# Patient Record
Sex: FEMALE | Race: WHITE | Employment: OTHER | URBAN - NONMETROPOLITAN AREA
[De-identification: names, ages, dates, MRNs, and addresses within clinical notes are randomized per-mention and may not be internally consistent; named-entity substitution may affect disease eponyms.]

---

## 2017-01-03 LAB — GLUCOSE BLD-MCNC: 128 MG/DL

## 2017-01-04 LAB
BUN BLDV-MCNC: NORMAL MG/DL
CALCIUM SERPL-MCNC: NORMAL MG/DL
CHLORIDE BLD-SCNC: NORMAL MMOL/L
CHOLESTEROL, TOTAL: 153 MG/DL
CHOLESTEROL/HDL RATIO: 3.1
CO2: NORMAL MMOL/L
CREAT SERPL-MCNC: NORMAL MG/DL
CREATININE, URINE: 218.9
GFR CALCULATED: NORMAL
GLUCOSE BLD-MCNC: 138 MG/DL
HDLC SERPL-MCNC: 50 MG/DL (ref 35–70)
LDL CHOLESTEROL CALCULATED: 81.2 MG/DL (ref 0–160)
MICROALBUMIN/CREAT 24H UR: 1.5 MG/G{CREAT}
MICROALBUMIN/CREAT UR-RTO: 6.9
POTASSIUM SERPL-SCNC: NORMAL MMOL/L
SODIUM BLD-SCNC: NORMAL MMOL/L
TRIGL SERPL-MCNC: 109 MG/DL
VLDLC SERPL CALC-MCNC: 22 MG/DL

## 2017-04-04 LAB — HBA1C MFR BLD: 5.9 %

## 2017-07-14 VITALS
DIASTOLIC BLOOD PRESSURE: 82 MMHG | HEIGHT: 64 IN | TEMPERATURE: 97.9 F | HEART RATE: 77 BPM | BODY MASS INDEX: 34.15 KG/M2 | WEIGHT: 200 LBS | OXYGEN SATURATION: 94 % | SYSTOLIC BLOOD PRESSURE: 106 MMHG

## 2017-07-14 DIAGNOSIS — R92.8 ABNORMAL MAMMOGRAM: ICD-10-CM

## 2017-10-26 ENCOUNTER — OFFICE VISIT (OUTPATIENT)
Dept: FAMILY MEDICINE CLINIC | Age: 62
End: 2017-10-26
Payer: COMMERCIAL

## 2017-10-26 VITALS
SYSTOLIC BLOOD PRESSURE: 124 MMHG | HEART RATE: 60 BPM | BODY MASS INDEX: 34.2 KG/M2 | WEIGHT: 193 LBS | DIASTOLIC BLOOD PRESSURE: 80 MMHG | HEIGHT: 63 IN

## 2017-10-26 DIAGNOSIS — Z12.31 SCREENING MAMMOGRAM, ENCOUNTER FOR: ICD-10-CM

## 2017-10-26 DIAGNOSIS — R73.01 IFG (IMPAIRED FASTING GLUCOSE): Primary | ICD-10-CM

## 2017-10-26 LAB — HBA1C MFR BLD: 5.8 %

## 2017-10-26 PROCEDURE — G8482 FLU IMMUNIZE ORDER/ADMIN: HCPCS | Performed by: FAMILY MEDICINE

## 2017-10-26 PROCEDURE — 3014F SCREEN MAMMO DOC REV: CPT | Performed by: FAMILY MEDICINE

## 2017-10-26 PROCEDURE — 1036F TOBACCO NON-USER: CPT | Performed by: FAMILY MEDICINE

## 2017-10-26 PROCEDURE — 83036 HEMOGLOBIN GLYCOSYLATED A1C: CPT | Performed by: FAMILY MEDICINE

## 2017-10-26 PROCEDURE — 3017F COLORECTAL CA SCREEN DOC REV: CPT | Performed by: FAMILY MEDICINE

## 2017-10-26 PROCEDURE — 99213 OFFICE O/P EST LOW 20 MIN: CPT | Performed by: FAMILY MEDICINE

## 2017-10-26 PROCEDURE — G8427 DOCREV CUR MEDS BY ELIG CLIN: HCPCS | Performed by: FAMILY MEDICINE

## 2017-10-26 PROCEDURE — G8417 CALC BMI ABV UP PARAM F/U: HCPCS | Performed by: FAMILY MEDICINE

## 2017-10-26 RX ORDER — DOXYCYCLINE HYCLATE 20 MG
TABLET ORAL
COMMUNITY
Start: 2017-10-04 | End: 2018-08-09

## 2017-10-26 ASSESSMENT — PATIENT HEALTH QUESTIONNAIRE - PHQ9
2. FEELING DOWN, DEPRESSED OR HOPELESS: 0
SUM OF ALL RESPONSES TO PHQ9 QUESTIONS 1 & 2: 0
SUM OF ALL RESPONSES TO PHQ QUESTIONS 1-9: 0
1. LITTLE INTEREST OR PLEASURE IN DOING THINGS: 0

## 2017-10-26 NOTE — PROGRESS NOTES
[address] [place] [address] [address]  Dept: [phone]  Dept Fax: [phone]    Janae Bay is a 57 y.o. female who presents today for her medical conditions/complaints as noted below.   Janae Bay is c/o of 6 Month Follow-Up (pt here to f/u on glucose and also has a spot on the inside of lower lip that would like ck'd)      HPI:     HPI   Patient is here for a routine check up  States that she is doing well   She has a history of Impaired fasting glucose, and food allergies      Review of her labs shows:  Hyperlipidemia  The 10-year CVD risk score (HADLEY'Clementino, et al., 2008) is: 5.6%    Values used to calculate the score:      Age: 57 years      Sex: Female      Diabetic: No      Tobacco smoker: No      Systolic Blood Pressure: 165 mmHg      Is BP treated: No      HDL Cholesterol: 50 mg/dL      Total Cholesterol: 153 mg/dL    Diabetes     Checking blood sugars   [x]   none In  of this year she had an elevated blood sugar of 250 and her HbA1C was 6.8; since that time she has been watching her diet and exercising    Meds   []   none    Last hemoglobin A1C   []   none    Last eye exam   []   none    microalbumin   []   none Normal 2017   Last diabetic foot exam   []   none Normal 2017   Endocrinologist    []   none    Hypoglycemic episodes    []   none    Complications   []   none     CAD    []   none           BP Readings from Last 3 Encounters:   10/26/17 124/80   17 106/82            (goal 120/80)    Past Medical History:   Diagnosis Date    Food allergy     MSG, Tomatoes, Orange Juice, Spicy Food      Past Surgical History:   Procedure Laterality Date     SECTION      x 3    CHOLECYSTECTOMY      laparoscopic    COLONOSCOPY  10/2006     Family History   Problem Relation Age of Onset    Coronary Art Dis Father     Diabetes Father     High Cholesterol Father     Other Father      colon polyp     Social History Substance Use Topics    Smoking status: Former Smoker     Quit date: 1/1/2014    Smokeless tobacco: Never Used    Alcohol use Not on file        Current Outpatient Prescriptions   Medication Sig Dispense Refill    FENUGREEK PO Take 2 tablets by mouth 3 times daily      Multiple Vitamins-Minerals (BEROCCA PO) Take by mouth daily      doxycycline hyclate (PERIOSTAT) 20 MG tablet       TETRACYCLINE HCL PO Take by mouth       No current facility-administered medications for this visit. Allergies   Allergen Reactions    Zithromax [Azithromycin]      nausea       Health Maintenance   Topic Date Due    Hepatitis C screen  1955    DTaP/Tdap/Td vaccine (1 - Tdap) 10/16/1972    Zostavax vaccine  10/16/2013    HIV screen  10/26/2027 (Originally 10/16/1968)    Breast cancer screen  04/11/2019    Diabetes screen  04/04/2020    Cervical cancer screen  04/13/2020    Lipid screen  01/04/2022    Colon cancer screen colonoscopy  04/07/2025    Flu vaccine  Completed       Subjective:      Review of Systems   Constitutional: Negative for appetite change, chills and fever. HENT: Negative for sore throat. Respiratory: Negative for shortness of breath and wheezing. Cardiovascular: Negative for chest pain, palpitations and leg swelling. Gastrointestinal: Negative for abdominal pain, blood in stool, constipation and diarrhea. Genitourinary: Negative for dysuria, hematuria and urgency. Hematological: Negative for adenopathy. Objective:     /80   Pulse 60   Ht 5' 3.1\" (1.603 m)   Wt 193 lb (87.5 kg)   BMI 34.08 kg/m²     Physical Exam   Constitutional: She appears well-developed and well-nourished. HENT:   Head: Normocephalic. Right Ear: Tympanic membrane normal.   Left Ear: Tympanic membrane normal.   Nose: Nose normal.   Mouth/Throat: No oropharyngeal exudate or posterior oropharyngeal erythema. Neck: Neck supple. Carotid bruit is not present. No thyromegaly present. Cardiovascular: Normal rate, regular rhythm, S1 normal and S2 normal.    No murmur heard. Pulmonary/Chest: Breath sounds normal. She has no wheezes. She has no rhonchi. She has no rales. She exhibits no tenderness. Abdominal: Soft. There is no hepatosplenomegaly. There is no tenderness. Musculoskeletal: She exhibits no edema. Lymphadenopathy:     She has no cervical adenopathy. She has no axillary adenopathy. Neurological: No cranial nerve deficit. Vitals reviewed. Assessment:     1. Need for prophylactic vaccination and inoculation against influenza     2. Need for hepatitis C screening test  Hepatitis C Antibody   3. IFG (impaired fasting glucose)  POCT glycosylated hemoglobin (Hb A1C)            POC Testing Results (If Applicable):  Results for POC orders placed in visit on 10/26/17   POCT glycosylated hemoglobin (Hb A1C)   Result Value Ref Range    Hemoglobin A1C 5.8 %       Plan:     Patient will continue lifestyle changes  Mammogram in  6 months     Orders Given:  Orders Placed This Encounter   Procedures    POCT glycosylated hemoglobin (Hb A1C)     Prescriptions:    No orders of the defined types were placed in this encounter. No Follow-up on file. Electronically signed by Angelina Randall MD on 10/26/2017.

## 2017-10-28 ASSESSMENT — ENCOUNTER SYMPTOMS
SHORTNESS OF BREATH: 0
SORE THROAT: 0
BLOOD IN STOOL: 0
ABDOMINAL PAIN: 0
DIARRHEA: 0
CONSTIPATION: 0
WHEEZING: 0

## 2018-05-03 ENCOUNTER — OFFICE VISIT (OUTPATIENT)
Dept: FAMILY MEDICINE CLINIC | Age: 63
End: 2018-05-03
Payer: COMMERCIAL

## 2018-05-03 VITALS
BODY MASS INDEX: 35.49 KG/M2 | DIASTOLIC BLOOD PRESSURE: 70 MMHG | WEIGHT: 201 LBS | SYSTOLIC BLOOD PRESSURE: 114 MMHG | HEART RATE: 60 BPM

## 2018-05-03 DIAGNOSIS — Z11.59 NEED FOR HEPATITIS C SCREENING TEST: ICD-10-CM

## 2018-05-03 DIAGNOSIS — J30.2 CHRONIC SEASONAL ALLERGIC RHINITIS, UNSPECIFIED TRIGGER: ICD-10-CM

## 2018-05-03 DIAGNOSIS — R73.01 IFG (IMPAIRED FASTING GLUCOSE): Primary | ICD-10-CM

## 2018-05-03 LAB
HBA1C MFR BLD: 6 %
HEPATITIS C IGG: NORMAL
SIGNAL/CUTOFF: NORMAL

## 2018-05-03 PROCEDURE — G8427 DOCREV CUR MEDS BY ELIG CLIN: HCPCS | Performed by: FAMILY MEDICINE

## 2018-05-03 PROCEDURE — G8417 CALC BMI ABV UP PARAM F/U: HCPCS | Performed by: FAMILY MEDICINE

## 2018-05-03 PROCEDURE — 3017F COLORECTAL CA SCREEN DOC REV: CPT | Performed by: FAMILY MEDICINE

## 2018-05-03 PROCEDURE — 1036F TOBACCO NON-USER: CPT | Performed by: FAMILY MEDICINE

## 2018-05-03 PROCEDURE — 99214 OFFICE O/P EST MOD 30 MIN: CPT | Performed by: FAMILY MEDICINE

## 2018-05-03 PROCEDURE — 83036 HEMOGLOBIN GLYCOSYLATED A1C: CPT | Performed by: FAMILY MEDICINE

## 2018-05-03 RX ORDER — METRONIDAZOLE 10 MG/G
GEL TOPICAL
COMMUNITY
Start: 2018-03-02 | End: 2018-08-09

## 2018-05-03 ASSESSMENT — ENCOUNTER SYMPTOMS
DIARRHEA: 0
CONSTIPATION: 0
SORE THROAT: 0
SHORTNESS OF BREATH: 0
WHEEZING: 0
BLOOD IN STOOL: 0
ABDOMINAL PAIN: 0

## 2018-05-07 DIAGNOSIS — Z11.59 NEED FOR HEPATITIS C SCREENING TEST: ICD-10-CM

## 2018-08-09 ENCOUNTER — OFFICE VISIT (OUTPATIENT)
Dept: FAMILY MEDICINE CLINIC | Age: 63
End: 2018-08-09
Payer: COMMERCIAL

## 2018-08-09 VITALS
BODY MASS INDEX: 35.49 KG/M2 | WEIGHT: 201 LBS | DIASTOLIC BLOOD PRESSURE: 60 MMHG | HEART RATE: 71 BPM | SYSTOLIC BLOOD PRESSURE: 114 MMHG

## 2018-08-09 DIAGNOSIS — L30.9 DERMATITIS: Primary | ICD-10-CM

## 2018-08-09 PROCEDURE — 1036F TOBACCO NON-USER: CPT | Performed by: FAMILY MEDICINE

## 2018-08-09 PROCEDURE — G8427 DOCREV CUR MEDS BY ELIG CLIN: HCPCS | Performed by: FAMILY MEDICINE

## 2018-08-09 PROCEDURE — 99213 OFFICE O/P EST LOW 20 MIN: CPT | Performed by: FAMILY MEDICINE

## 2018-08-09 PROCEDURE — G8417 CALC BMI ABV UP PARAM F/U: HCPCS | Performed by: FAMILY MEDICINE

## 2018-08-09 PROCEDURE — 3017F COLORECTAL CA SCREEN DOC REV: CPT | Performed by: FAMILY MEDICINE

## 2018-08-09 RX ORDER — LORATADINE 10 MG/1
10 TABLET ORAL DAILY
Qty: 30 TABLET | Refills: 0 | Status: SHIPPED | OUTPATIENT
Start: 2018-08-09 | End: 2019-04-26 | Stop reason: ALTCHOICE

## 2018-08-09 RX ORDER — TRIAMCINOLONE ACETONIDE 40 MG/ML
40 INJECTION, SUSPENSION INTRA-ARTICULAR; INTRAMUSCULAR ONCE
Status: COMPLETED | OUTPATIENT
Start: 2018-08-09 | End: 2018-08-09

## 2018-08-09 RX ADMIN — TRIAMCINOLONE ACETONIDE 40 MG: 40 INJECTION, SUSPENSION INTRA-ARTICULAR; INTRAMUSCULAR at 08:33

## 2018-08-09 ASSESSMENT — ENCOUNTER SYMPTOMS
EYE DISCHARGE: 0
RESPIRATORY NEGATIVE: 1

## 2018-08-09 NOTE — PROGRESS NOTES
[address] [facility]  [address]  Dept: [phone]  Dept Fax: [phone]    Lolly Huddleston is a 57 y.o. female who presents today for her medical conditions/complaints as noted below. Lolly Huddleston is c/o of Rash (pt reports she has had a rash on lower abd, doesn't hurt or itch but at night is scratching while sleeping, thinks may due to wearing a new shirt that had not washed, rash is spreading has been using cortisone cream but not helping)      HPI:     HPI   Patient comes in with a rash. She notes that  night or Saturday night after [employer]. She wore a new blouse. That night when she took a shower she noticed a red patch on the right side of her abdomen. Doesn't really itch. She wonders if she isn't scratching in her sleep and she says seems to be getting larger and \"bruised\". Using hydrocortisone cream 2% without any efficacy. It does not itch. It does not hurt. She has a few patches on the left side of her abdomen. She is otherwise asymptomatic. No systemic symptoms. No new medications.   There is nothing around her neck or wrists or her axilla           BP Readings from Last 3 Encounters:   18 114/60   18 114/70   10/26/17 124/80            (goal 120/80)    Past Medical History:   Diagnosis Date    Food allergy     MSG, Tomatoes, Orange Juice, Spicy Food      Past Surgical History:   Procedure Laterality Date     SECTION      x 3    CHOLECYSTECTOMY      laparoscopic    COLONOSCOPY  10/2006     Family History   Problem Relation Age of Onset    Coronary Art Dis Father     Diabetes Father     High Cholesterol Father     Other Father         colon polyp     Social History   Substance Use Topics    Smoking status: Former Smoker     Quit date: 2014    Smokeless tobacco: Never Used    Alcohol use Not on file        Current Outpatient Prescriptions   Medication Sig Dispense Refill    loratadine (CLARITIN) 10 MG tablet Take 1 tablet by mouth daily 30 tablet 0    Multiple Vitamins-Minerals (BEROCCA PO) Take by mouth daily       No current facility-administered medications for this visit. Allergies   Allergen Reactions    Zithromax [Azithromycin]      nausea       Health Maintenance   Topic Date Due    DTaP/Tdap/Td vaccine (1 - Tdap) 10/16/1972    Shingles Vaccine (1 of 2 - 2 Dose Series) 10/16/2003    HIV screen  10/26/2027 (Originally 10/16/1968)    Flu vaccine (1) 09/01/2018    A1C test (Diabetic or Prediabetic)  05/03/2019    Cervical cancer screen  04/13/2020    Breast cancer screen  04/27/2020    Lipid screen  01/04/2022    Colon cancer screen colonoscopy  04/07/2025    Hepatitis C screen  Completed       Lab Results   Component Value Date    LABA1C 6.0 05/03/2018    MICROALBUR 1.5 01/04/2017    GLUCOSE 138 01/04/2017      Lab Results   Component Value Date    CHOL 153 01/04/2017    TRIG 109 01/04/2017    HDL 50 01/04/2017       Subjective:      Review of Systems   Constitutional: Negative for activity change, chills, diaphoresis and fever. HENT: Negative. Negative for sneezing. Eyes: Negative for discharge. Respiratory: Negative. Cardiovascular: Negative. Skin: Positive for rash. Hematological: Negative for adenopathy. Objective:     /60   Pulse 71   Wt 201 lb (91.2 kg)   BMI 35.49 kg/m²     Physical Exam   Constitutional: No distress. Cardiovascular: Normal rate, regular rhythm and normal heart sounds. Pulmonary/Chest: Effort normal and breath sounds normal.   Abdominal:           Assessment:      Diagnosis Orders   1. Dermatitis  triamcinolone acetonide (KENALOG-40) injection 40 mg    loratadine (CLARITIN) 10 MG tablet            POC Testing Results (If Applicable):  No results found for this visit on 08/09/18. Plan:     We'll try Kenalog. Stop cortisone cream.  Try Claritin 10 mg daily.   Patient is to call if the rash continues to

## 2018-09-13 ENCOUNTER — TELEPHONE (OUTPATIENT)
Dept: FAMILY MEDICINE CLINIC | Age: 63
End: 2018-09-13

## 2018-09-13 ENCOUNTER — OFFICE VISIT (OUTPATIENT)
Dept: FAMILY MEDICINE CLINIC | Age: 63
End: 2018-09-13
Payer: COMMERCIAL

## 2018-09-13 VITALS
HEART RATE: 64 BPM | DIASTOLIC BLOOD PRESSURE: 72 MMHG | SYSTOLIC BLOOD PRESSURE: 132 MMHG | TEMPERATURE: 99.9 F | WEIGHT: 195 LBS | BODY MASS INDEX: 33.29 KG/M2 | HEIGHT: 64 IN

## 2018-09-13 DIAGNOSIS — R31.9 HEMATURIA, UNSPECIFIED TYPE: Primary | ICD-10-CM

## 2018-09-13 DIAGNOSIS — K75.9 HEPATITIS: Primary | ICD-10-CM

## 2018-09-13 DIAGNOSIS — K75.9 HEPATITIS: ICD-10-CM

## 2018-09-13 LAB
A/G RATIO: 1.2 RATIO
AGE FOR GFR: 64
ALBUMIN: 3.5 G/DL
ALK PHOSPHATASE: 239 UNITS/L
ALT SERPL-CCNC: 249 UNITS/L
ANION GAP SERPL CALCULATED.3IONS-SCNC: 11 MMOL/L
AST SERPL-CCNC: 161 UNITS/L
BASOPHILS # BLD: 0.09 THOU/MM3
BILIRUB SERPL-MCNC: 1.6 MG/DL
BILIRUBIN, POC: ABNORMAL
BLOOD CULTURE, ROUTINE: NORMAL
BLOOD URINE, POC: ABNORMAL
BUN BLDV-MCNC: 15 MG/DL
CALCIUM SERPL-MCNC: 8.9 MG/DL
CHLORIDE BLD-SCNC: 102 MMOL/L
CLARITY, POC: ABNORMAL
CO2: 27 MMOL/L
COLOR, POC: ABNORMAL
CREAT SERPL-MCNC: 0.7 MG/DL
DIFFERENTIAL: AUTOMATED DIFF
EGFR BF: 102 ML/MIN/1.73 M2
EGFR BM: 138 ML/MIN/1.73 M2
EGFR WF: 84 ML/MIN/1.73 M2
EGFR WM: 114 ML/MIN/1.73 M2
EOSINOPHIL # BLD: 0.05 THOU/MM3
GLOBULIN: 3 G/DL
GLUCOSE URINE, POC: NEGATIVE
GLUCOSE: 194 MG/DL
HAV IGM SER IA-ACNC: NORMAL
HCT VFR BLD CALC: 41.5 %
HEMOGLOBIN: 13 G/DL
HEPATITIS B CORE IGM ANTIBODY: NORMAL
HEPATITIS B SURFACE ANTIGEN: NORMAL
HEPATITIS C IGG: NORMAL
KETONES, POC: ABNORMAL
LEUKOCYTE EST, POC: ABNORMAL
LYMPHOCYTES # BLD: 1.44 THOU/MM3
Lab: NORMAL
MCH RBC QN AUTO: 29.3 PG
MCHC RBC AUTO-ENTMCNC: 31.3 G/DL
MCV RBC AUTO: 93.8 FL
MONOCYTES # BLD: 0.5 THOU/MM3
NEUTROPHILS: 3.99 THOU/MM3
NITRITE, POC: NEGATIVE
PDW BLD-RTO: 12.9 %
PH, POC: 6
PLATELET # BLD: 303 THOU/MM3
PMV BLD AUTO: 7.4 FL
POTASSIUM SERPL-SCNC: 4.3 MMOL/L
PROTEIN, POC: ABNORMAL
RBC # BLD: 4.42 M/UL
SIGNAL/CUTOFF: NORMAL
SODIUM BLD-SCNC: 136 MMOL/L
SPECIFIC GRAVITY, POC: 1.02
TOTAL PROTEIN: 6.5 G/DL
URINE CULTURE, ROUTINE: NORMAL
UROBILINOGEN, POC: 0.2
WBC # BLD: 6.07 THOU/ML3

## 2018-09-13 PROCEDURE — 3017F COLORECTAL CA SCREEN DOC REV: CPT | Performed by: FAMILY MEDICINE

## 2018-09-13 PROCEDURE — 1036F TOBACCO NON-USER: CPT | Performed by: FAMILY MEDICINE

## 2018-09-13 PROCEDURE — 99214 OFFICE O/P EST MOD 30 MIN: CPT | Performed by: FAMILY MEDICINE

## 2018-09-13 PROCEDURE — G8427 DOCREV CUR MEDS BY ELIG CLIN: HCPCS | Performed by: FAMILY MEDICINE

## 2018-09-13 PROCEDURE — 87086 URINE CULTURE/COLONY COUNT: CPT | Performed by: FAMILY MEDICINE

## 2018-09-13 PROCEDURE — G8417 CALC BMI ABV UP PARAM F/U: HCPCS | Performed by: FAMILY MEDICINE

## 2018-09-13 PROCEDURE — 81002 URINALYSIS NONAUTO W/O SCOPE: CPT | Performed by: FAMILY MEDICINE

## 2018-09-13 RX ORDER — CIPROFLOXACIN 500 MG/1
500 TABLET, FILM COATED ORAL 2 TIMES DAILY
Qty: 20 TABLET | Refills: 0 | Status: SHIPPED | OUTPATIENT
Start: 2018-09-13 | End: 2018-09-21

## 2018-09-13 ASSESSMENT — PATIENT HEALTH QUESTIONNAIRE - PHQ9
1. LITTLE INTEREST OR PLEASURE IN DOING THINGS: 0
SUM OF ALL RESPONSES TO PHQ QUESTIONS 1-9: 0
SUM OF ALL RESPONSES TO PHQ9 QUESTIONS 1 & 2: 0
2. FEELING DOWN, DEPRESSED OR HOPELESS: 0
SUM OF ALL RESPONSES TO PHQ QUESTIONS 1-9: 0
2. FEELING DOWN, DEPRESSED OR HOPELESS: 0
SUM OF ALL RESPONSES TO PHQ9 QUESTIONS 1 & 2: 0

## 2018-09-13 ASSESSMENT — ENCOUNTER SYMPTOMS
BLOOD IN STOOL: 0
COUGH: 0
CHEST TIGHTNESS: 0
SHORTNESS OF BREATH: 0
NAUSEA: 1
ABDOMINAL PAIN: 0
VOMITING: 1
ABDOMINAL DISTENTION: 0
BACK PAIN: 1

## 2018-09-17 ENCOUNTER — TELEPHONE (OUTPATIENT)
Dept: FAMILY MEDICINE CLINIC | Age: 63
End: 2018-09-17

## 2018-09-17 DIAGNOSIS — R31.9 HEMATURIA, UNSPECIFIED TYPE: Primary | ICD-10-CM

## 2018-09-21 ENCOUNTER — OFFICE VISIT (OUTPATIENT)
Dept: FAMILY MEDICINE CLINIC | Age: 63
End: 2018-09-21
Payer: COMMERCIAL

## 2018-09-21 VITALS
SYSTOLIC BLOOD PRESSURE: 130 MMHG | HEIGHT: 64 IN | HEART RATE: 78 BPM | WEIGHT: 195 LBS | DIASTOLIC BLOOD PRESSURE: 70 MMHG | BODY MASS INDEX: 33.29 KG/M2

## 2018-09-21 DIAGNOSIS — K75.9 HEPATITIS: Primary | ICD-10-CM

## 2018-09-21 DIAGNOSIS — R31.9 HEMATURIA, UNSPECIFIED TYPE: ICD-10-CM

## 2018-09-21 LAB
A/G RATIO: 1.4 RATIO
ALBUMIN: 3.8 G/DL
ALK PHOSPHATASE: 161 UNITS/L
ALT SERPL-CCNC: 111 UNITS/L
APPEARANCE: CLEAR
AST SERPL-CCNC: 99 UNITS/L
BACTERIA: NORMAL 1HPF
BILIRUB SERPL-MCNC: 0.7 MG/DL
BILIRUBIN DIRECT: 0 MG/DL
BILIRUBIN: NORMAL
BLOOD: NORMAL
CASTS: NORMAL /LPF
COLOR: YELLOW
CRYSTALS: NORMAL /HPF
EPITHELIAL CELLS, UA: NORMAL /HPF
GLOBULIN: 2.8 G/DL
GLUCOSE: NORMAL MG/DL
KETONES: NORMAL MG/DL
LEUKOCYTES, UA: NORMAL
MICROSCOPIC URINE: NORMAL
MUCUS: NORMAL /HPF
NITRITE, URINE: NORMAL
PH: 6 PH
PROTEIN,SCREEN: NORMAL MG/DL
RBC: NORMAL /HPF
SPECIFIC GRAVITY, URINE: 1.02 MG/DL
TOTAL PROTEIN: 6.6 G/DL
UROBILINOGEN, URINE: 0.2 MG/DL
WBC URINE: NORMAL
YEAST: NORMAL /HPF

## 2018-09-21 PROCEDURE — 1036F TOBACCO NON-USER: CPT | Performed by: FAMILY MEDICINE

## 2018-09-21 PROCEDURE — 99214 OFFICE O/P EST MOD 30 MIN: CPT | Performed by: FAMILY MEDICINE

## 2018-09-21 PROCEDURE — 3017F COLORECTAL CA SCREEN DOC REV: CPT | Performed by: FAMILY MEDICINE

## 2018-09-21 PROCEDURE — G8427 DOCREV CUR MEDS BY ELIG CLIN: HCPCS | Performed by: FAMILY MEDICINE

## 2018-09-21 PROCEDURE — G8417 CALC BMI ABV UP PARAM F/U: HCPCS | Performed by: FAMILY MEDICINE

## 2018-09-21 PROCEDURE — 87086 URINE CULTURE/COLONY COUNT: CPT | Performed by: FAMILY MEDICINE

## 2018-09-24 NOTE — PROGRESS NOTES
Dispense Refill    Probiotic Product (PROBIOTIC ADVANCED PO) Take by mouth      Omeprazole Magnesium (PRILOSEC) 10 MG PACK Take by mouth      loratadine (CLARITIN) 10 MG tablet Take 1 tablet by mouth daily 30 tablet 0    Multiple Vitamins-Minerals (BEROCCA PO) Take by mouth daily       No current facility-administered medications for this visit. Allergies   Allergen Reactions    Zithromax [Azithromycin]      nausea       Health Maintenance   Topic Date Due    DTaP/Tdap/Td vaccine (1 - Tdap) 10/16/1972    Shingles Vaccine (1 of 2 - 2 Dose Series) 10/16/2003    Flu vaccine (1) 09/01/2018    HIV screen  10/26/2027 (Originally 10/16/1968)    A1C test (Diabetic or Prediabetic)  05/03/2019    Cervical cancer screen  04/13/2020    Breast cancer screen  04/27/2020    Lipid screen  01/04/2022    Colon cancer screen colonoscopy  04/07/2025    Hepatitis C screen  Completed       Lab Results   Component Value Date    K 4.3 09/13/2018    CREATININE 0.7 09/13/2018    AST 99 09/21/2018     09/21/2018    HCT 41.5 09/13/2018    LABA1C 6.0 05/03/2018    MICROALBUR 1.5 01/04/2017    GLUCOSE neg 09/21/2018    CALCIUM 8.9 09/13/2018      Lab Results   Component Value Date    CHOL 153 01/04/2017    TRIG 109 01/04/2017    HDL 50 01/04/2017       Review of Systems:      Review of Systems   Constitutional: Positive for activity change (Feeling much better) and appetite change (Improved). Negative for diaphoresis and fever. HENT: Negative. Eyes: Negative for redness and visual disturbance. Thinks she still has icterus   Respiratory: Negative. Cardiovascular: Negative. Gastrointestinal: Negative for abdominal distention, abdominal pain, blood in stool, constipation, diarrhea, nausea and vomiting. Genitourinary: Negative for dysuria and hematuria.        Objective:     /70   Pulse 78   Ht 5' 3.5\" (1.613 m)   Wt 195 lb (88.5 kg)   BMI 34.00 kg/m²     Physical Exam   Constitutional: No

## 2018-09-25 ASSESSMENT — ENCOUNTER SYMPTOMS
RESPIRATORY NEGATIVE: 1
ABDOMINAL DISTENTION: 0
NAUSEA: 0
EYE REDNESS: 0
BLOOD IN STOOL: 0
DIARRHEA: 0
CONSTIPATION: 0
VOMITING: 0
ABDOMINAL PAIN: 0

## 2018-09-26 ENCOUNTER — TELEPHONE (OUTPATIENT)
Dept: FAMILY MEDICINE CLINIC | Age: 63
End: 2018-09-26

## 2018-09-26 DIAGNOSIS — K75.9 HEPATITIS: Primary | ICD-10-CM

## 2018-10-25 LAB
A/G RATIO: 1.5 RATIO
ALBUMIN: 3.8 G/DL
ALK PHOSPHATASE: 81 UNITS/L
ALT SERPL-CCNC: 51 UNITS/L
AST SERPL-CCNC: 60 UNITS/L
BILIRUB SERPL-MCNC: 0.6 MG/DL
BILIRUBIN DIRECT: 0 MG/DL
GLOBULIN: 2.6 G/DL
TOTAL PROTEIN: 6.4 G/DL

## 2018-11-01 ENCOUNTER — OFFICE VISIT (OUTPATIENT)
Dept: FAMILY MEDICINE CLINIC | Age: 63
End: 2018-11-01
Payer: COMMERCIAL

## 2018-11-01 VITALS
DIASTOLIC BLOOD PRESSURE: 70 MMHG | BODY MASS INDEX: 34.87 KG/M2 | WEIGHT: 200 LBS | HEART RATE: 74 BPM | OXYGEN SATURATION: 98 % | SYSTOLIC BLOOD PRESSURE: 130 MMHG

## 2018-11-01 DIAGNOSIS — D12.6 TUBULAR ADENOMA OF COLON: ICD-10-CM

## 2018-11-01 DIAGNOSIS — K75.9 HEPATITIS: ICD-10-CM

## 2018-11-01 DIAGNOSIS — R73.01 IFG (IMPAIRED FASTING GLUCOSE): Primary | ICD-10-CM

## 2018-11-01 DIAGNOSIS — Z23 NEED FOR PROPHYLACTIC VACCINATION AND INOCULATION AGAINST INFLUENZA: ICD-10-CM

## 2018-11-01 LAB — HBA1C MFR BLD: 6.2 %

## 2018-11-01 PROCEDURE — 99214 OFFICE O/P EST MOD 30 MIN: CPT | Performed by: FAMILY MEDICINE

## 2018-11-01 PROCEDURE — 1036F TOBACCO NON-USER: CPT | Performed by: FAMILY MEDICINE

## 2018-11-01 PROCEDURE — 1123F ACP DISCUSS/DSCN MKR DOCD: CPT | Performed by: FAMILY MEDICINE

## 2018-11-01 PROCEDURE — 3017F COLORECTAL CA SCREEN DOC REV: CPT | Performed by: FAMILY MEDICINE

## 2018-11-01 PROCEDURE — 1101F PT FALLS ASSESS-DOCD LE1/YR: CPT | Performed by: FAMILY MEDICINE

## 2018-11-01 PROCEDURE — 83036 HEMOGLOBIN GLYCOSYLATED A1C: CPT | Performed by: FAMILY MEDICINE

## 2018-11-01 PROCEDURE — G8417 CALC BMI ABV UP PARAM F/U: HCPCS | Performed by: FAMILY MEDICINE

## 2018-11-01 PROCEDURE — G8484 FLU IMMUNIZE NO ADMIN: HCPCS | Performed by: FAMILY MEDICINE

## 2018-11-01 PROCEDURE — G8427 DOCREV CUR MEDS BY ELIG CLIN: HCPCS | Performed by: FAMILY MEDICINE

## 2018-11-01 PROCEDURE — 1090F PRES/ABSN URINE INCON ASSESS: CPT | Performed by: FAMILY MEDICINE

## 2018-11-01 PROCEDURE — G8400 PT W/DXA NO RESULTS DOC: HCPCS | Performed by: FAMILY MEDICINE

## 2018-11-01 PROCEDURE — 4040F PNEUMOC VAC/ADMIN/RCVD: CPT | Performed by: FAMILY MEDICINE

## 2018-11-08 ASSESSMENT — ENCOUNTER SYMPTOMS
CONSTIPATION: 0
COUGH: 0
EYE ITCHING: 0
ABDOMINAL PAIN: 0
SHORTNESS OF BREATH: 0
DIARRHEA: 0
BLOOD IN STOOL: 0
WHEEZING: 0

## 2019-02-04 LAB
A/G RATIO: 1.4 RATIO
ALBUMIN: 3.6 G/DL
ALK PHOSPHATASE: 99 UNITS/L
ALT SERPL-CCNC: 66 UNITS/L
AST SERPL-CCNC: 71 UNITS/L
BILIRUB SERPL-MCNC: 0.6 MG/DL
BILIRUBIN DIRECT: 0 MG/DL
GLOBULIN: 2.6 G/DL
TOTAL PROTEIN: 6.2 G/DL

## 2019-04-22 ENCOUNTER — TELEPHONE (OUTPATIENT)
Dept: FAMILY MEDICINE CLINIC | Age: 64
End: 2019-04-22

## 2019-04-22 DIAGNOSIS — R74.01 ELEVATED TRANSAMINASE LEVEL: ICD-10-CM

## 2019-04-22 DIAGNOSIS — K75.9 HEPATITIS: Primary | ICD-10-CM

## 2019-04-22 NOTE — TELEPHONE ENCOUNTER
Pt is requesting to have her liver functions tested again prior to her next visit. She stated her last levels were elevated and she has stopped taking her tumeric and wants to see if this has helped lower her levels.

## 2019-04-23 LAB
A/G RATIO: 1.2 RATIO
ALBUMIN: 3.7 G/DL (ref 3.5–5)
ALK PHOSPHATASE: 294 UNITS/L (ref 38–126)
ALT SERPL-CCNC: 148 UNITS/L (ref 9–52)
AST SERPL-CCNC: 109 UNITS/L (ref 14–36)
BILIRUB SERPL-MCNC: 1.1 MG/DL (ref 0.2–1.3)
BILIRUBIN DIRECT: 0 MG/DL (ref 0–0.3)
GLOBULIN: 3.1 G/DL
TOTAL PROTEIN: 6.8 G/DL (ref 6.3–8.2)

## 2019-04-26 ENCOUNTER — OFFICE VISIT (OUTPATIENT)
Dept: FAMILY MEDICINE CLINIC | Age: 64
End: 2019-04-26
Payer: COMMERCIAL

## 2019-04-26 VITALS
DIASTOLIC BLOOD PRESSURE: 76 MMHG | SYSTOLIC BLOOD PRESSURE: 126 MMHG | HEART RATE: 107 BPM | BODY MASS INDEX: 34 KG/M2 | OXYGEN SATURATION: 96 % | WEIGHT: 195 LBS

## 2019-04-26 DIAGNOSIS — K75.9 HEPATITIS: Primary | ICD-10-CM

## 2019-04-26 DIAGNOSIS — R73.01 IFG (IMPAIRED FASTING GLUCOSE): ICD-10-CM

## 2019-04-26 LAB
ADDITIONAL TESTING: NORMAL
ANA SCREEN: NORMAL
ANCA SCREEN: NORMAL
CCP IGG ANTIBODIES: NORMAL
COMMENT: NORMAL
HBA1C MFR BLD: 6.8 %
SMOOTH MUSCLE ANTIBODY: NORMAL

## 2019-04-26 PROCEDURE — G8427 DOCREV CUR MEDS BY ELIG CLIN: HCPCS | Performed by: FAMILY MEDICINE

## 2019-04-26 PROCEDURE — 1036F TOBACCO NON-USER: CPT | Performed by: FAMILY MEDICINE

## 2019-04-26 PROCEDURE — 83036 HEMOGLOBIN GLYCOSYLATED A1C: CPT | Performed by: FAMILY MEDICINE

## 2019-04-26 PROCEDURE — G8417 CALC BMI ABV UP PARAM F/U: HCPCS | Performed by: FAMILY MEDICINE

## 2019-04-26 PROCEDURE — 1090F PRES/ABSN URINE INCON ASSESS: CPT | Performed by: FAMILY MEDICINE

## 2019-04-26 PROCEDURE — G8400 PT W/DXA NO RESULTS DOC: HCPCS | Performed by: FAMILY MEDICINE

## 2019-04-26 PROCEDURE — 99214 OFFICE O/P EST MOD 30 MIN: CPT | Performed by: FAMILY MEDICINE

## 2019-04-26 PROCEDURE — 4040F PNEUMOC VAC/ADMIN/RCVD: CPT | Performed by: FAMILY MEDICINE

## 2019-04-26 PROCEDURE — 3017F COLORECTAL CA SCREEN DOC REV: CPT | Performed by: FAMILY MEDICINE

## 2019-04-26 PROCEDURE — 1123F ACP DISCUSS/DSCN MKR DOCD: CPT | Performed by: FAMILY MEDICINE

## 2019-04-26 ASSESSMENT — ENCOUNTER SYMPTOMS
BLOOD IN STOOL: 0
EYE ITCHING: 0
WHEEZING: 0
DIARRHEA: 0
SHORTNESS OF BREATH: 0
ABDOMINAL PAIN: 0
CONSTIPATION: 0
COUGH: 0

## 2019-04-26 NOTE — PROGRESS NOTES
[address] [facility]  [address]  Dept: [phone]  DeptFax: [phone]    Ching Rosales is a66 y.o. female who presents today for her medical conditions/complaints as noted below. Ching Rosales is c/o of Hepatitis (pt here to f/u on lab results. pt had called and asked for repeat hepatic funtion due to elevated liver enzymes and states she was feeling like her liver enzymes were elevated again)      HPI:     HPI     Patient returns to the office with once again a hepatitis-like picture. In February her liver profile showed some elevation of AST ALT. She called one of the labs repeated. She is noted that her blood sugars have been elevated. She's been unable to get them down. She checks once daily. Elevation of her sugars began in March. 195 this morning. Recalls that the same thing happened when she had hepatitis in September of last year. At that time there is some question is whether or not she picked up and normal small medication. Some type of viral illness. Hepatitis panel was negative. And without treatment by October last year liver panel showed an elevation of AST to 60. Previouslyit is it is in September AST was 161  alk phos 239 and bilirubin 1.6. Likewise her sugars came down. Hemoglobin A1c was 6.2. She wonders if stress doesn't play a role in her blood sugars. Sister-in-law is paralyzed from a fall at her nursing home. Brother came down with lymphoma. In addition she stopped turmeric. She been taking that to help her sugars because she read that that can be beneficial.  But then she read that tumor could be associated with autoimmune hepatitis. No data on the Internet. However she stopped the tumeric in February. When he first repeated her labs and she hasn't felt as though she didn't last September. No change in the color of her urine.   Remembering that she thought she had blood in her urine but it was in fact bilirubin. No malika-colored stools. She does complain of heartburn and frequent burping. No blood per rectum. No melanotic stools. Colonoscopy performed in  with 1 small tubular adenoma. Her AST is now 109  alkaline phosphatase 294. CT scan was performed in September. She is status post cholecystectomy. Mild bile duct enlargement  enlargement. Nothing else  BP Readings from Last 3 Encounters:   19 126/76   18 130/70   18 130/70            (goal 120/80)    Past Medical History:   Diagnosis Date    Food allergy     MSG, Tomatoes, Orange Juice, Spicy Food      Past Surgical History:   Procedure Laterality Date     SECTION      x 3    CHOLECYSTECTOMY      laparoscopic    COLONOSCOPY  10/2006    COLONOSCOPY      Dr Lauren Sahu; tubular adenoma      Family History   Problem Relation Age of Onset    Coronary Art Dis Father     Diabetes Father     High Cholesterol Father     Other Father         colon polyp     Social History     Tobacco Use    Smoking status: Former Smoker     Packs/day: 1.00     Years: 30.00     Pack years: 30.00     Types: Cigarettes     Last attempt to quit: 2014     Years since quittin.3    Smokeless tobacco: Never Used   Substance Use Topics    Alcohol use: Not on file        Current Outpatient Medications   Medication Sig Dispense Refill    Cholecalciferol (VITAMIN D PO) Take by mouth      Multiple Vitamins-Minerals (BEROCCA PO) Take by mouth daily      Probiotic Product (PROBIOTIC ADVANCED PO) Take by mouth      Omeprazole Magnesium (PRILOSEC) 10 MG PACK Take by mouth       No current facility-administered medications for this visit.       Allergies   Allergen Reactions    Zithromax [Azithromycin]      nausea       Health Maintenance   Topic Date Due    DTaP/Tdap/Td vaccine (1 - Tdap) 10/16/1972    Shingles Vaccine (1 of 2) 10/16/2003    Low dose CT lung screening  10/16/2008    DEXA (modify frequency per FRAX score)  10/16/2018    Pneumococcal 65+ years Vaccine (1 of 2 - PCV13) 10/16/2018    HIV screen  10/26/2027 (Originally 10/16/1968)    Flu vaccine (Season Ended) 09/01/2019    A1C test (Diabetic or Prediabetic)  11/01/2019    Cervical cancer screen  04/13/2020    Breast cancer screen  04/27/2020    Lipid screen  01/04/2022    Colon cancer screen colonoscopy  04/07/2025    Hepatitis C screen  Completed       Lab Results   Component Value Date    K 4.3 09/13/2018    CREATININE 0.7 09/13/2018     04/23/2019     04/23/2019    HCT 41.5 09/13/2018    LABA1C 6.2 11/01/2018    MICROALBUR 1.5 01/04/2017    GLUCOSE neg 09/21/2018    CALCIUM 8.9 09/13/2018      Lab Results   Component Value Date    CHOL 153 01/04/2017    TRIG 109 01/04/2017    HDL 50 01/04/2017       Subjective:      Review of Systems   Constitutional: Negative for appetite change, chills, fatigue and fever. HENT: Negative. Eyes: Negative for itching. Respiratory: Negative for cough, shortness of breath and wheezing. Cardiovascular: Negative for chest pain. Gastrointestinal: Negative for abdominal pain, blood in stool, constipation, diarrhea, nausea and vomiting. Endocrine: Negative for polydipsia, polyphagia and polyuria. Genitourinary: Negative for dysuria, hematuria and urgency. Skin: Negative for rash. Hematological: Negative for adenopathy. Objective:     /76   Pulse 107   Wt 195 lb (88.5 kg)   SpO2 96%   BMI 34.00 kg/m²     Physical Exam   Constitutional: She appears well-developed and well-nourished. HENT:   Mouth/Throat: Oropharynx is clear and moist and mucous membranes are normal.   Eyes: Conjunctivae and EOM are normal. No scleral icterus. Neck: Neck supple. Carotid bruit is not present. No thyromegaly present. Cardiovascular: Normal rate, regular rhythm, S1 normal, S2 normal and normal heart sounds. No murmur heard. Pulmonary/Chest: Breath sounds normal. She has no wheezes.

## 2019-04-27 ASSESSMENT — ENCOUNTER SYMPTOMS
VOMITING: 0
NAUSEA: 0

## 2019-05-01 DIAGNOSIS — K75.9 HEPATITIS: ICD-10-CM

## 2019-05-03 LAB
A/G RATIO: 1.3 RATIO
ALBUMIN: 3.6 G/DL (ref 3.5–5)
ALK PHOSPHATASE: 318 UNITS/L (ref 38–126)
ALT SERPL-CCNC: 128 UNITS/L (ref 9–52)
AST SERPL-CCNC: 134 UNITS/L (ref 14–36)
BILIRUB SERPL-MCNC: 1.5 MG/DL (ref 0.2–1.3)
BILIRUBIN DIRECT: 0 MG/DL (ref 0–0.3)
GLOBULIN: 2.8 G/DL
TOTAL PROTEIN: 6.4 G/DL (ref 6.3–8.2)

## 2019-05-07 ENCOUNTER — OFFICE VISIT (OUTPATIENT)
Dept: FAMILY MEDICINE CLINIC | Age: 64
End: 2019-05-07
Payer: COMMERCIAL

## 2019-05-07 VITALS
HEART RATE: 76 BPM | WEIGHT: 194 LBS | OXYGEN SATURATION: 98 % | DIASTOLIC BLOOD PRESSURE: 86 MMHG | BODY MASS INDEX: 33.83 KG/M2 | SYSTOLIC BLOOD PRESSURE: 126 MMHG

## 2019-05-07 DIAGNOSIS — R73.01 IFG (IMPAIRED FASTING GLUCOSE): Primary | ICD-10-CM

## 2019-05-07 DIAGNOSIS — Z12.31 SCREENING MAMMOGRAM, ENCOUNTER FOR: ICD-10-CM

## 2019-05-07 DIAGNOSIS — K75.9 HEPATITIS: ICD-10-CM

## 2019-05-07 DIAGNOSIS — E11.9 TYPE 2 DIABETES MELLITUS WITHOUT COMPLICATION, WITHOUT LONG-TERM CURRENT USE OF INSULIN (HCC): ICD-10-CM

## 2019-05-07 LAB — HBA1C MFR BLD: 7.2 %

## 2019-05-07 PROCEDURE — 4040F PNEUMOC VAC/ADMIN/RCVD: CPT | Performed by: FAMILY MEDICINE

## 2019-05-07 PROCEDURE — 99213 OFFICE O/P EST LOW 20 MIN: CPT | Performed by: FAMILY MEDICINE

## 2019-05-07 PROCEDURE — 3045F PR MOST RECENT HEMOGLOBIN A1C LEVEL 7.0-9.0%: CPT | Performed by: FAMILY MEDICINE

## 2019-05-07 PROCEDURE — G8400 PT W/DXA NO RESULTS DOC: HCPCS | Performed by: FAMILY MEDICINE

## 2019-05-07 PROCEDURE — 1123F ACP DISCUSS/DSCN MKR DOCD: CPT | Performed by: FAMILY MEDICINE

## 2019-05-07 PROCEDURE — 2022F DILAT RTA XM EVC RTNOPTHY: CPT | Performed by: FAMILY MEDICINE

## 2019-05-07 PROCEDURE — G8427 DOCREV CUR MEDS BY ELIG CLIN: HCPCS | Performed by: FAMILY MEDICINE

## 2019-05-07 PROCEDURE — 1036F TOBACCO NON-USER: CPT | Performed by: FAMILY MEDICINE

## 2019-05-07 PROCEDURE — G8417 CALC BMI ABV UP PARAM F/U: HCPCS | Performed by: FAMILY MEDICINE

## 2019-05-07 PROCEDURE — 83036 HEMOGLOBIN GLYCOSYLATED A1C: CPT | Performed by: FAMILY MEDICINE

## 2019-05-07 PROCEDURE — 3017F COLORECTAL CA SCREEN DOC REV: CPT | Performed by: FAMILY MEDICINE

## 2019-05-07 PROCEDURE — 1090F PRES/ABSN URINE INCON ASSESS: CPT | Performed by: FAMILY MEDICINE

## 2019-05-07 ASSESSMENT — PATIENT HEALTH QUESTIONNAIRE - PHQ9
1. LITTLE INTEREST OR PLEASURE IN DOING THINGS: 0
SUM OF ALL RESPONSES TO PHQ QUESTIONS 1-9: 0
SUM OF ALL RESPONSES TO PHQ9 QUESTIONS 1 & 2: 0
SUM OF ALL RESPONSES TO PHQ QUESTIONS 1-9: 0
2. FEELING DOWN, DEPRESSED OR HOPELESS: 0

## 2019-05-07 ASSESSMENT — ENCOUNTER SYMPTOMS
BLOOD IN STOOL: 0
EYE ITCHING: 0
DIARRHEA: 0
CONSTIPATION: 0
NAUSEA: 0
VOMITING: 0
ABDOMINAL PAIN: 0
SHORTNESS OF BREATH: 0
COUGH: 0
WHEEZING: 0

## 2019-05-07 NOTE — PATIENT INSTRUCTIONS
Patient Education        Autoimmune Hepatitis: Care Instructions  Your Care Instructions    Autoimmune hepatitis is a long-term disease that makes the body's defenses (immune system) attack the liver. This causes liver inflammation and damage. Sometimes chemicals, certain medicines, or a virus can cause cells in your body to attack your liver. Some people appear to be more likely to get this disease. And women get it more often than men. It can cause tiredness, belly discomfort, and itchy skin. You may also have diarrhea and fluid buildup in your belly (ascites). Your skin and eyes may look yellow. This is called jaundice. And you may not want to eat, so you may lose weight. But there are medicines you can take to keep your liver damage from getting worse. Follow-up care is a key part of your treatment and safety. Be sure to make and go to all appointments, and call your doctor if you are having problems. It's also a good idea to know your test results and keep a list of the medicines you take. How can you care for yourself at home? · Be safe with medicines. Take your medicines exactly as prescribed. Call your doctor if you have any problems with your medicine. You will get more details on the specific medicines your doctor prescribes. · Lower your activity to match your energy. · Avoid alcohol for as long as your doctor tells you to. Tell your doctor if you need help to quit. Counseling, support groups, and sometimes medicines can help you stay sober. · Make sure your doctor knows all the medicines you take. Some medicines, such as acetaminophen (Tylenol), can make liver problems worse. Do not take any new medicines, and do not stop taking prescribed medicines, unless your doctor says it is okay. · Follow your doctor's instructions about your diet. You may need a low-salt diet. Salt is in many prepared foods, such as peraza, canned foods, snack foods, sauces, and soups.  Look for reduced-salt

## 2019-05-07 NOTE — PROGRESS NOTES
[address] [facility]  [address]  Dept: [phone]  DeptFax: [phone]    Graciela Clark is a66 y.o. female who presents today for her medical conditions/complaints as noted below. Graciela Clark is c/o of 6 Month Follow-Up (pt says she is really nervous about her hepatatis lab results) and Hepatitis      HPI:     HPI   Patient comes in for her regular scheduled visit. In addition her abnormal liver testing. Impaired fasting glucose  The patient had been managing with lifestyle changes. She checks usually once a day ; in the morning ; her sugars are running higher 180-200.  and the last time that they were running high  occurred with her elevation of her liver enzymes. She has no polyuria, polyphagia or polydipsia. HbA1C 6.8 last visit ; HbA1C is 7.2 today     Jaundice/elevated transaminases  She will follow up with Dr Todd Schneider next week ; he has always done her colonoscopies ; will see him on the    Her ultrasound showed dilated intra- and extrahepatic ducts   She no longer has \"malika colored stools\"   Most recent liver panel showed a bilirubin of 1.5; ;  ; alk phos 318  Serologies showed an elevated smooth muscle antibody; otherwise negative    She gets chills and will take about 400 mg of ibuprofen ; happens out of the blue ; does not use acetaminophen since last year's bout of hepatitis ; and does not drink EtOH     BP Readings from Last 3 Encounters:   19 126/86   19 126/76   18 130/70            (goal 120/80)    Past Medical History:   Diagnosis Date    Food allergy     MSG, Tomatoes, Orange Juice, Spicy Food      Past Surgical History:   Procedure Laterality Date     SECTION      x 3    CHOLECYSTECTOMY      laparoscopic    COLONOSCOPY  10/2006    COLONOSCOPY      Dr Derrick Castanon; tubular adenoma      Family History   Problem Relation Age of Onset    Coronary Art Dis Father    May Epstein Diabetes Father     High Cholesterol Father     Other Father         colon polyp     Social History     Tobacco Use    Smoking status: Former Smoker     Packs/day: 1.00     Years: 30.00     Pack years: 30.00     Types: Cigarettes     Last attempt to quit: 2014     Years since quittin.3    Smokeless tobacco: Never Used   Substance Use Topics    Alcohol use: Not on file        Current Outpatient Medications   Medication Sig Dispense Refill    dapagliflozin (FARXIGA) 5 MG tablet Take 1 tablet by mouth every morning 30 tablet 3    Cholecalciferol (VITAMIN D PO) Take by mouth      Probiotic Product (PROBIOTIC ADVANCED PO) Take by mouth      Omeprazole Magnesium (PRILOSEC) 10 MG PACK Take by mouth      Multiple Vitamins-Minerals (BEROCCA PO) Take by mouth daily       No current facility-administered medications for this visit.       Allergies   Allergen Reactions    Zithromax [Azithromycin]      nausea       Health Maintenance   Topic Date Due    Diabetic foot exam  10/16/1963    Diabetic retinal exam  10/16/1963    DTaP/Tdap/Td vaccine (1 - Tdap) 10/16/1972    Shingles Vaccine (1 of 2) 10/16/2003    Low dose CT lung screening  10/16/2008    Diabetic microalbuminuria test  2018    Lipid screen  2018    DEXA (modify frequency per FRAX score)  10/16/2018    Pneumococcal 65+ years Vaccine (1 of 2 - PCV13) 10/16/2018    HIV screen  10/26/2027 (Originally 10/16/1968)    Flu vaccine (Season Ended) 2019    Cervical cancer screen  2020    A1C test (Diabetic or Prediabetic)  2020    Breast cancer screen  2020    Colon cancer screen colonoscopy  2025    Hepatitis C screen  Completed       Lab Results   Component Value Date    K 4.3 2018    CREATININE 0.7 2018     2019     2019    HCT 41.5 2018    LABA1C 7.2 2019    MICROALBUR 1.5 2017    GLUCOSE neg 2018    CALCIUM 8.9 2018      Lab Results Screening mammogram, encounter for  [facility] DIGITAL SCREEN W CAD BILATERAL            POC Testing Results (If Applicable):  Results for POC orders placed in visit on 05/07/19   POCT glycosylated hemoglobin (Hb A1C)   Result Value Ref Range    Hemoglobin A1C 7.2 %       Plan:     Follow-up with Dr. Elsy Ellis. A copy of her labs were printed off that she can take to that visit. We entertained the diagnosis of autoimmune hepatitis though that is not definitive. Reordered mammograms. In the face of her continued elevation of her hemoglobin A1c even increasing in the last few weeks, I  will initiate treatment. Choosing to start farxiga  to avoid liver metabolism. Recheck in 2 months sooner if any problems    Orders Given:  Orders Placed This Encounter   Procedures    [facility] DIGITAL SCREEN W CAD BILATERAL     Standing Status:   Future     Standing Expiration Date:   7/6/2020    POCT glycosylated hemoglobin (Hb A1C)     Prescriptions:    Orders Placed This Encounter   Medications    dapagliflozin (FARXIGA) 5 MG tablet     Sig: Take 1 tablet by mouth every morning     Dispense:  30 tablet     Refill:  3        Return in about 2 months (around 7/7/2019). Electronically signed by Sean Fitzgerald MD on5/7/2019. **This report has been created using voice recognition software. It may contain minor errors which are inherent in voice recognition technology. **

## 2019-05-30 ENCOUNTER — TELEPHONE (OUTPATIENT)
Dept: FAMILY MEDICINE CLINIC | Age: 64
End: 2019-05-30

## 2019-05-31 RX ORDER — PANTOPRAZOLE SODIUM 40 MG/1
40 TABLET, DELAYED RELEASE ORAL
Qty: 30 TABLET | Refills: 5 | Status: SHIPPED | OUTPATIENT
Start: 2019-05-31 | End: 2019-09-23 | Stop reason: SDUPTHER

## 2019-05-31 NOTE — TELEPHONE ENCOUNTER
Alvina [facility] Transitions Initial Follow Up Call    Outreach made within 2 business days of discharge: YES    Patient: Yash Morgan Patient : 1955   MRN: [id number]  Reason for Admission: Diagnosed with Pancreatic Cancer, Biopsy completed  Discharge Date:    19      Spoke with: [facility]    Discharge department/facility: [address] [place] [address],[address] Interactive Patient Contact:  Was patient able to fill all prescriptions: yes. States, but I was told by [facility] to call my doctor and get a script for Protonix 40 mg. I am currently on an ATB to try and help with bile back up. Was patient instructed to bring all medications to the follow-up visit: yes  Is patient taking all medications as directed in the discharge summary? yes  Does patient understand their discharge instructions: yes  Does patient have questions or concerns that need addressed prior to 7-14 day follow up office visit: yes, denies any questions at this time will call if any arise.      Scheduled appointment with PCP within 7-14 days    Follow Up  Future Appointments   Date Time Provider Aureliano Mederos   2019  2:45 PM Antelmo Lee MD [facility]   7/15/2019  9:15 AM Antelmo Lee MD [facility]       Vicente Domínguez LPN

## 2019-06-07 ENCOUNTER — OFFICE VISIT (OUTPATIENT)
Dept: FAMILY MEDICINE CLINIC | Age: 64
End: 2019-06-07
Payer: COMMERCIAL

## 2019-06-07 VITALS
BODY MASS INDEX: 32.61 KG/M2 | HEIGHT: 64 IN | SYSTOLIC BLOOD PRESSURE: 124 MMHG | DIASTOLIC BLOOD PRESSURE: 84 MMHG | WEIGHT: 191 LBS | HEART RATE: 72 BPM

## 2019-06-07 DIAGNOSIS — K83.8 AMPULLA OF VATER MASS: Primary | ICD-10-CM

## 2019-06-07 DIAGNOSIS — N30.00 ACUTE CYSTITIS WITHOUT HEMATURIA: ICD-10-CM

## 2019-06-07 DIAGNOSIS — K83.1 OBSTRUCTIVE JAUNDICE: ICD-10-CM

## 2019-06-07 DIAGNOSIS — E11.9 TYPE 2 DIABETES MELLITUS WITHOUT COMPLICATION, WITHOUT LONG-TERM CURRENT USE OF INSULIN (HCC): ICD-10-CM

## 2019-06-07 PROCEDURE — 1111F DSCHRG MED/CURRENT MED MERGE: CPT | Performed by: FAMILY MEDICINE

## 2019-06-07 PROCEDURE — 99495 TRANSJ CARE MGMT MOD F2F 14D: CPT | Performed by: FAMILY MEDICINE

## 2019-06-07 RX ORDER — ONDANSETRON 4 MG/1
TABLET, ORALLY DISINTEGRATING ORAL
Refills: 0 | COMMUNITY
Start: 2019-05-25 | End: 2019-08-22

## 2019-06-07 RX ORDER — AMOXICILLIN AND CLAVULANATE POTASSIUM 875; 125 MG/1; MG/1
TABLET, FILM COATED ORAL
Refills: 0 | COMMUNITY
Start: 2019-05-30 | End: 2019-06-20 | Stop reason: ALTCHOICE

## 2019-06-07 RX ORDER — PANTOPRAZOLE SODIUM 40 MG/1
40 TABLET, DELAYED RELEASE ORAL
COMMUNITY
Start: 2019-05-31 | End: 2019-06-07 | Stop reason: SDUPTHER

## 2019-06-07 RX ORDER — URSODIOL 300 MG/1
CAPSULE ORAL
Refills: 0 | COMMUNITY
Start: 2019-05-30 | End: 2019-08-22

## 2019-06-07 NOTE — PROGRESS NOTES
Position: Sitting   Pulse: 72   Weight: 191 lb (86.6 kg)   Height: 5' 4\" (1.626 m)     Body mass index is 32.79 kg/m². Wt Readings from Last 3 Encounters:   06/07/19 191 lb (86.6 kg)   05/07/19 194 lb (88 kg)   04/26/19 195 lb (88.5 kg)     BP Readings from Last 3 Encounters:   06/07/19 124/84   05/07/19 126/86   04/26/19 126/76       Review of Systems   Constitutional: Positive for appetite change, fatigue and unexpected weight change. Negative for fever. HENT: Negative. Eyes: Negative. Respiratory: Negative for cough, shortness of breath and wheezing. Cardiovascular: Negative for chest pain, palpitations and leg swelling. Gastrointestinal: Positive for abdominal pain. Negative for abdominal distention, blood in stool, nausea and vomiting. Genitourinary: Negative for dysuria and hematuria. Musculoskeletal: Negative. Neurological: Negative. Psychiatric/Behavioral: The patient is nervous/anxious. Physical Exam   Constitutional: She appears well-developed and well-nourished. HENT:   Mouth/Throat: Oropharynx is clear and moist and mucous membranes are normal.   Eyes: EOM are normal. No scleral icterus. Neck: Neck supple. Carotid bruit is not present. No thyromegaly present. Cardiovascular: Normal rate, regular rhythm, S1 normal, S2 normal and normal heart sounds. No murmur heard. Pulmonary/Chest: Breath sounds normal. She has no wheezes. She has no rhonchi. She has no rales. She exhibits no tenderness. Abdominal: Soft. Bowel sounds are normal. She exhibits no distension. There is no hepatosplenomegaly. There is no tenderness. Musculoskeletal: She exhibits no edema. Lymphadenopathy:     She has no cervical adenopathy. She has no axillary adenopathy. Neurological: No cranial nerve deficit. Skin:   No jaundice   Vitals reviewed. Assessment/Plan:  1.  Ampulla of Vater mass  During our or her visit here today, she received the information that surgery is planned shortly. 2. Obstructive jaundice  Improved    3. Type 2 diabetes mellitus without complication, without long-term current use of insulin (HCC)  No changes. Recommend that she at least try to eat several small meals daily; soft, bland    4. Acute cystitis without hematuria  Presumably resolved. Medical Decision Making: moderate complexity   [facility] hence for her Whipple procedure. Return the office postoperatively.

## 2019-06-14 ASSESSMENT — ENCOUNTER SYMPTOMS
EYES NEGATIVE: 1
ABDOMINAL PAIN: 1
VOMITING: 0
BLOOD IN STOOL: 0
COUGH: 0
NAUSEA: 0
SHORTNESS OF BREATH: 0
WHEEZING: 0
ABDOMINAL DISTENTION: 0

## 2019-06-17 ENCOUNTER — TELEPHONE (OUTPATIENT)
Dept: FAMILY MEDICINE CLINIC | Age: 64
End: 2019-06-17

## 2019-06-17 NOTE — TELEPHONE ENCOUNTER
D/C [facility] 6/14/19, [facility] appt 6/20/19 9:45

## 2019-06-20 ENCOUNTER — OFFICE VISIT (OUTPATIENT)
Dept: FAMILY MEDICINE CLINIC | Age: 64
End: 2019-06-20
Payer: COMMERCIAL

## 2019-06-20 VITALS
HEART RATE: 74 BPM | BODY MASS INDEX: 32.96 KG/M2 | WEIGHT: 192 LBS | DIASTOLIC BLOOD PRESSURE: 68 MMHG | SYSTOLIC BLOOD PRESSURE: 126 MMHG | OXYGEN SATURATION: 97 %

## 2019-06-20 DIAGNOSIS — K83.8 AMPULLA OF VATER MASS: Primary | ICD-10-CM

## 2019-06-20 DIAGNOSIS — K83.1 OBSTRUCTIVE JAUNDICE: ICD-10-CM

## 2019-06-20 DIAGNOSIS — Z98.890 S/P GASTROINTESTINAL SURGERY: ICD-10-CM

## 2019-06-20 PROCEDURE — 99215 OFFICE O/P EST HI 40 MIN: CPT | Performed by: FAMILY MEDICINE

## 2019-06-20 RX ORDER — DOCUSATE SODIUM 100 MG/1
100 CAPSULE, LIQUID FILLED ORAL 2 TIMES DAILY PRN
COMMUNITY
End: 2019-08-22

## 2019-06-20 RX ORDER — OXYCODONE HYDROCHLORIDE 5 MG/1
5 TABLET ORAL EVERY 6 HOURS PRN
COMMUNITY
Start: 2019-06-14 | End: 2019-06-20 | Stop reason: SDUPTHER

## 2019-06-20 RX ORDER — OXYCODONE HYDROCHLORIDE 5 MG/1
5 TABLET ORAL EVERY 6 HOURS PRN
Qty: 20 TABLET | Refills: 0 | Status: SHIPPED | OUTPATIENT
Start: 2019-06-20 | End: 2019-06-25

## 2019-06-20 ASSESSMENT — ENCOUNTER SYMPTOMS
VOMITING: 0
CONSTIPATION: 0
WHEEZING: 0
NAUSEA: 0
DIARRHEA: 0
COUGH: 0
BLOOD IN STOOL: 0
SHORTNESS OF BREATH: 0
ABDOMINAL PAIN: 1

## 2019-06-20 NOTE — PROGRESS NOTES
6/9-6/14    lovenox x 30 days  Pantoprazole  roxicodone 5mg q 6  # 15  Colace     Refilled oxycodone  Initial report   Post-Discharge Transitional Care Management Services or Hospital Follow Up      Alexei Diaz   YOB: 1955    Date of Office Visit:  6/20/2019  Date of Hospital Admission: 6/9/2019  Date of Hospital Discharge: 6/14/2019    Care management risk score Rising risk (score 2-5) and Complex Care (Scores >=6): 2     Non face to face  following discharge, date last encounter closed (first attempt may have been earlier): *No documented post hospital discharge outreach found in the last 14 days     Call initiated 2 business days of discharge: *No response recorded in the last 14 days    Patient Active Problem List   Diagnosis    Abnormal mammogram    IFG (impaired fasting glucose)    Hepatitis       Allergies   Allergen Reactions    Zithromax [Azithromycin]      nausea       Medications listed as ordered at the time of discharge from hospital       Medications marked \"taking\" at this time  Outpatient Medications Marked as Taking for the 6/20/19 encounter (Office Visit) with Sean Fitzgerald MD   Medication Sig Dispense Refill    enoxaparin (LOVENOX) 40 MG/0.4ML injection Inject 40 mg into the skin daily      docusate sodium (COLACE) 100 MG capsule Take 100 mg by mouth 2 times daily as needed for Constipation      oxyCODONE (ROXICODONE) 5 MG immediate release tablet Take 1 tablet by mouth every 6 hours as needed for Pain (due to operation) for up to 5 days.  20 tablet 0    ursodiol (ACTIGALL) 300 MG capsule take 1 capsule by mouth twice a day  0    Cholecalciferol (VITAMIN D3) 1000 units CAPS Take 2,000 Units by mouth daily       pantoprazole (PROTONIX) 40 MG tablet Take 1 tablet by mouth every morning (before breakfast) 30 tablet 5    Probiotic Product (PROBIOTIC ADVANCED PO) Take by mouth      Multiple Vitamins-Minerals (BEROCCA PO) Take by mouth daily          Medications patient taking as of now reconciled against medications ordered at time of hospital discharge: Yes    Chief Complaint   Patient presents with    Follow-Up from Hospital     d/c from [address] [place] [address] 6/14/19 - dx pancreatic mass - s/p transduodenal ampullary polyp resection 6/10/19, tylenol is not helping the incisional pain she is still having, would like something to help with the pain       HPI   Patient returns to the office after her recent surgery. Reviewing her records. 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 This was blocking the bile duct not the pancreatic duct. Surgery was going to be done in [facility] but she did not want to wait until the 28th of this month. She was admitted emergently to the [address] [place] [address] clinic on the above date. Inpatient course: Discharge summary reviewed-surgery was performed on the 10th. I am not certain that this is exactly a traditional Whipple procedure. She knows that the biopsy at the time of surgery was negative. But that report might take 5 to 7 days for the final pathology. She is discharged 4 days later after transitioning from IV pain medicines to oral pain medications. Interval history/Current status: She is doing very well. Ambulating. She is not needing to use any stool softeners. Is bothered by incisional pain and it hurts a lot to cough and hurts if she has to bear down for bowel movement. But she does not. She is giving herself Lovenox. She has been taking oxycodone immediate release for pain and would like a short refill. She actually has an abdominal binder but she finds it too cumbersome to put on. He is not short of breath. No coughing. There is slight oozing from the incision. Vitals:    06/20/19 1010   BP: 126/68   Site: Left Upper Arm   Position: Sitting   Cuff Size: Large Adult   Pulse: 74   SpO2: 97%   Weight: 192 lb (87.1 kg)     Body mass index is 32.96 kg/m².    Wt Readings from Last 3 Encounters:   06/20/19 192 lb (87.1 kg)   06/07/19 191 lb (86.6 kg)   05/07/19 194 lb (88 kg)     BP Readings from Last 3 Encounters:   06/20/19 126/68   06/07/19 124/84   05/07/19 126/86       Review of Systems   Constitutional: Positive for activity change, appetite change and fatigue. Negative for diaphoresis and fever. HENT: Negative. Respiratory: Negative for cough, shortness of breath and wheezing. Cardiovascular: Negative for chest pain, palpitations and leg swelling. Gastrointestinal: Positive for abdominal pain (incisional ). Negative for blood in stool, constipation, diarrhea, nausea and vomiting. Genitourinary: Negative for dysuria. Musculoskeletal: Negative. Psychiatric/Behavioral: Negative for dysphoric mood. Physical Exam   Constitutional:   Looks well no acute distress   HENT:   Right Ear: Tympanic membrane normal.   Left Ear: Tympanic membrane normal.   Mouth/Throat: Oropharynx is clear and moist and mucous membranes are normal.   Eyes: No scleral icterus. Neck: Neck supple. Cardiovascular: Normal rate, regular rhythm, S1 normal, S2 normal and normal heart sounds. No murmur heard. Pulmonary/Chest: She has no decreased breath sounds. She has no wheezes. She has no rhonchi. She has no rales. Abdominal:       Musculoskeletal:   Calves nontender             Assessment/Plan:  1. Ampulla of Vater mass  Doing well await final pathology and follow-up with [facility]    2. Obstructive jaundice  Seems to be resolved clinically    3. S/P gastrointestinal surgery  She can continue to observe the wound. Incision. Very minimal bleeding. Encouraged to walk around. Reminded to continue Lovenox. It is reasonable to refill her pain medications. - oxyCODONE (ROXICODONE) 5 MG immediate release tablet; Take 1 tablet by mouth every 6 hours as needed for Pain (due to operation) for up to 5 days. Dispense: 20 tablet;  Refill: 0        Medical Decision Making: moderate complexity   The present time I do not see any need to intervene on the incision. She will notify me if that should change. She will follow-[facility] with [facility]. I think that she can move her appointment in 1 month back. Reschedule her for 3 months.   Return sooner if any problems

## 2019-08-22 ENCOUNTER — OFFICE VISIT (OUTPATIENT)
Dept: FAMILY MEDICINE CLINIC | Age: 64
End: 2019-08-22
Payer: COMMERCIAL

## 2019-08-22 VITALS
SYSTOLIC BLOOD PRESSURE: 124 MMHG | BODY MASS INDEX: 32.96 KG/M2 | WEIGHT: 192 LBS | OXYGEN SATURATION: 98 % | HEART RATE: 66 BPM | DIASTOLIC BLOOD PRESSURE: 74 MMHG | TEMPERATURE: 96.6 F

## 2019-08-22 PROCEDURE — 3017F COLORECTAL CA SCREEN DOC REV: CPT | Performed by: FAMILY MEDICINE

## 2019-08-22 PROCEDURE — 99213 OFFICE O/P EST LOW 20 MIN: CPT | Performed by: FAMILY MEDICINE

## 2019-08-22 PROCEDURE — G8427 DOCREV CUR MEDS BY ELIG CLIN: HCPCS | Performed by: FAMILY MEDICINE

## 2019-08-22 PROCEDURE — 4040F PNEUMOC VAC/ADMIN/RCVD: CPT | Performed by: FAMILY MEDICINE

## 2019-08-22 PROCEDURE — G8417 CALC BMI ABV UP PARAM F/U: HCPCS | Performed by: FAMILY MEDICINE

## 2019-08-22 PROCEDURE — G8400 PT W/DXA NO RESULTS DOC: HCPCS | Performed by: FAMILY MEDICINE

## 2019-08-22 PROCEDURE — 1090F PRES/ABSN URINE INCON ASSESS: CPT | Performed by: FAMILY MEDICINE

## 2019-08-22 PROCEDURE — 1036F TOBACCO NON-USER: CPT | Performed by: FAMILY MEDICINE

## 2019-08-22 PROCEDURE — 1123F ACP DISCUSS/DSCN MKR DOCD: CPT | Performed by: FAMILY MEDICINE

## 2019-08-22 RX ORDER — CEPHALEXIN 500 MG/1
CAPSULE ORAL
Qty: 20 CAPSULE | Refills: 0 | Status: SHIPPED | OUTPATIENT
Start: 2019-08-22 | End: 2019-08-30 | Stop reason: ALTCHOICE

## 2019-08-22 NOTE — PROGRESS NOTES
[address] [address] [address]  Dept: [phone]  DeptFax: [phone]    Alyssa Winter is a66 y.o. female who presents today for her medical conditions/complaints as noted below. Alyssa Winter is c/o of Wound Check (yelow drainage from inscision in [employer], naval red)      HPI:     HPI    Patient comes the office with possible omphalitis. 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 Removed at the [facility] clinic with an open procedure. The last few days  her umbilicus has become tender. Red. Occasionally there is serous fluid foul-smelling that she can express. She is concerned that there might be communication to her abdomen. That is the peritoneal cavity.       BP Readings from Last 3 Encounters:   19 124/74   19 126/68   19 124/84            (goal 120/80)    Past Medical History:   Diagnosis Date    Food allergy     MSG, Tomatoes, Orange Juice, Spicy Food    Pancreatic mass       Past Surgical History:   Procedure Laterality Date     SECTION      x 3    CHOLECYSTECTOMY      laparoscopic    COLONOSCOPY  10/2006    COLONOSCOPY      Dr Carito Chun; tubular adenoma     PANCREAS SURGERY      s/p transduodenal ampullary polyp resection 6/10/19     Family History   Problem Relation Age of Onset    Coronary Art Dis Father     Diabetes Father     High Cholesterol Father     Other Father         colon polyp     Social History     Tobacco Use    Smoking status: Former Smoker     Packs/day: 1.00     Years: 30.00     Pack years: 30.00     Types: Cigarettes     Last attempt to quit: 2014     Years since quittin.6    Smokeless tobacco: Never Used   Substance Use Topics    Alcohol use: Not on file        Current Outpatient Medications   Medication Sig Dispense Refill    mupirocin (BACTROBAN) 2 % ointment Apply to affected area (with Qtip)

## 2019-08-23 ASSESSMENT — ENCOUNTER SYMPTOMS
SHORTNESS OF BREATH: 0
VOMITING: 0
DIARRHEA: 0
COUGH: 0
ABDOMINAL PAIN: 1
NAUSEA: 0
BLOOD IN STOOL: 0
WHEEZING: 0
CONSTIPATION: 0

## 2019-08-30 ENCOUNTER — OFFICE VISIT (OUTPATIENT)
Dept: FAMILY MEDICINE CLINIC | Age: 64
End: 2019-08-30
Payer: COMMERCIAL

## 2019-08-30 VITALS
OXYGEN SATURATION: 96 % | HEIGHT: 64 IN | BODY MASS INDEX: 33.12 KG/M2 | DIASTOLIC BLOOD PRESSURE: 80 MMHG | HEART RATE: 81 BPM | SYSTOLIC BLOOD PRESSURE: 130 MMHG | TEMPERATURE: 98.4 F | WEIGHT: 194 LBS

## 2019-08-30 DIAGNOSIS — R10.11 RUQ PAIN: Primary | ICD-10-CM

## 2019-08-30 LAB
A/G RATIO: 1.3 RATIO
AGE FOR GFR: 65
ALBUMIN: 3.9 G/DL (ref 3.5–5)
ALK PHOSPHATASE: 69 UNITS/L (ref 38–126)
ALT SERPL-CCNC: 17 UNITS/L (ref 9–52)
AMYLASE: 80 UNITS/L (ref 31–110)
ANION GAP SERPL CALCULATED.3IONS-SCNC: 10 MMOL/L
AST SERPL-CCNC: 23 UNITS/L (ref 14–36)
BASOPHILS # BLD: 0.05 THOU/MM3 (ref 0–0.3)
BILIRUB SERPL-MCNC: 0.3 MG/DL (ref 0.2–1.3)
BUN BLDV-MCNC: 15 MG/DL (ref 7–17)
CALCIUM SERPL-MCNC: 8.9 MG/DL (ref 8.4–10.2)
CHLORIDE BLD-SCNC: 106 MMOL/L (ref 98–120)
CO2: 29 MMOL/L (ref 22–31)
CREAT SERPL-MCNC: 0.8 MG/DL (ref 0.5–1)
DIFFERENTIAL: AUTOMATED DIFF
EGFR BF: 87 ML/MIN/1.73 M2
EGFR BM: 118 ML/MIN/1.73 M2
EGFR WF: 72 ML/MIN/1.73 M2
EGFR WM: 97 ML/MIN/1.73 M2
EOSINOPHIL # BLD: 0.11 THOU/MM3 (ref 0–1.1)
GLOBULIN: 2.9 G/DL
GLUCOSE: 139 MG/DL (ref 65–105)
HCT VFR BLD CALC: 42 % (ref 37–47)
HEMOGLOBIN: 13.8 G/DL (ref 12–16)
LIPASE: 101 UNITS/L (ref 23–300)
LYMPHOCYTES # BLD: 2.02 THOU/MM3 (ref 1–5.5)
MCH RBC QN AUTO: 29 PG (ref 28.5–32)
MCHC RBC AUTO-ENTMCNC: 32.7 G/DL (ref 32–37)
MCV RBC AUTO: 88.5 FL (ref 80–94)
MONOCYTES # BLD: 0.51 THOU/MM3 (ref 0.1–1)
NEUTROPHILS: 3.48 THOU/MM3 (ref 2–8.1)
PDW BLD-RTO: 11.3 % (ref 8.5–15.5)
PLATELET # BLD: 279 THOU/MM3 (ref 130–400)
PMV BLD AUTO: 7.3 FL (ref 7.4–11)
POTASSIUM SERPL-SCNC: 3.7 MMOL/L (ref 3.6–5)
RBC # BLD: 4.75 M/UL (ref 4.2–5.4)
SODIUM BLD-SCNC: 141 MMOL/L (ref 135–145)
TOTAL PROTEIN: 6.8 G/DL (ref 6.3–8.2)
WBC # BLD: 6.17 THOU/ML3 (ref 4.8–10)

## 2019-08-30 PROCEDURE — 1090F PRES/ABSN URINE INCON ASSESS: CPT | Performed by: NURSE PRACTITIONER

## 2019-08-30 PROCEDURE — 99214 OFFICE O/P EST MOD 30 MIN: CPT | Performed by: NURSE PRACTITIONER

## 2019-08-30 PROCEDURE — 1123F ACP DISCUSS/DSCN MKR DOCD: CPT | Performed by: NURSE PRACTITIONER

## 2019-08-30 PROCEDURE — 3017F COLORECTAL CA SCREEN DOC REV: CPT | Performed by: NURSE PRACTITIONER

## 2019-08-30 PROCEDURE — G8400 PT W/DXA NO RESULTS DOC: HCPCS | Performed by: NURSE PRACTITIONER

## 2019-08-30 PROCEDURE — 4040F PNEUMOC VAC/ADMIN/RCVD: CPT | Performed by: NURSE PRACTITIONER

## 2019-08-30 PROCEDURE — G8417 CALC BMI ABV UP PARAM F/U: HCPCS | Performed by: NURSE PRACTITIONER

## 2019-08-30 PROCEDURE — G8427 DOCREV CUR MEDS BY ELIG CLIN: HCPCS | Performed by: NURSE PRACTITIONER

## 2019-08-30 PROCEDURE — 1036F TOBACCO NON-USER: CPT | Performed by: NURSE PRACTITIONER

## 2019-08-30 ASSESSMENT — ENCOUNTER SYMPTOMS
ABDOMINAL PAIN: 1
BELCHING: 0
VOMITING: 0
NAUSEA: 0
DIARRHEA: 1
CONSTIPATION: 0

## 2019-08-30 NOTE — PROGRESS NOTES
[address] [place] [address] In  [facility], [facility]  [address]  Phone:  [phone]  Fax:  [phone]  Marbella Barrera is a 70 y.o. female who presents today for her medical conditions/complaints as noted below. Caleb Weinstein c/o of Abdominal Pain (Right upper quadrant pain today around 11am.  Comes and goes. Had a mini whipple 6/10/19 at [facility]. Was recently treated for infected belly button.)    Patient is worried she may be developing pancreatitis again. HPI:     Abdominal Pain   This is a recurrent problem. The current episode started today. The problem occurs constantly. The problem has been waxing and waning. The pain is located in the RUQ. The pain is at a severity of 7/10. The quality of the pain is sharp. The abdominal pain radiates to the back. Associated symptoms include diarrhea. Pertinent negatives include no anorexia, belching, constipation, dysuria, fever, frequency, nausea, vomiting or weight loss. The pain is aggravated by movement. The pain is relieved by movement. She has tried nothing for the symptoms. Her past medical history is significant for abdominal surgery and pancreatitis. adenoma benign of the pancreas with Whipple in May.        Wt Readings from Last 3 Encounters:   19 194 lb (88 kg)   19 192 lb (87.1 kg)   19 192 lb (87.1 kg)       Temp Readings from Last 3 Encounters:   19 98.4 °F (36.9 °C) (Tympanic)   19 96.6 °F (35.9 °C) (Tympanic)   18 99.9 °F (37.7 °C)       BP Readings from Last 3 Encounters:   19 130/80   19 124/74   19 126/68       Pulse Readings from Last 3 Encounters:   19 81   19 66   19 74              Past Medical History:   Diagnosis Date    Food allergy     MSG, Tomatoes, Orange Juice, Spicy Food    Pancreatic mass       Past Surgical History:   Procedure Laterality Date     SECTION      x 3    CHOLECYSTECTOMY laparoscopic    COLONOSCOPY  10/2006    COLONOSCOPY      Dr Mayito Whelan; tubular adenoma     PANCREAS SURGERY      s/p transduodenal ampullary polyp resection 6/10/19     Family History   Problem Relation Age of Onset    Coronary Art Dis Father     Diabetes Father     High Cholesterol Father     Other Father         colon polyp     Social History     Tobacco Use    Smoking status: Former Smoker     Packs/day: 1.00     Years: 30.00     Pack years: 30.00     Types: Cigarettes     Last attempt to quit: 2014     Years since quittin.6    Smokeless tobacco: Never Used   Substance Use Topics    Alcohol use: Not on file      Current Outpatient Medications   Medication Sig Dispense Refill    Cholecalciferol (VITAMIN D3) 1000 units CAPS Take 2,000 Units by mouth daily       pantoprazole (PROTONIX) 40 MG tablet Take 1 tablet by mouth every morning (before breakfast) 30 tablet 5    Probiotic Product (PROBIOTIC ADVANCED PO) Take by mouth      Multiple Vitamins-Minerals (BEROCCA PO) Take by mouth daily       No current facility-administered medications for this visit. Allergies   Allergen Reactions    Zithromax [Azithromycin]      nausea       No exam data present    Subjective:      Review of Systems   Constitutional: Negative for fever and weight loss. Gastrointestinal: Positive for abdominal pain and diarrhea. Negative for anorexia, constipation, nausea and vomiting. Genitourinary: Negative for dysuria and frequency. Objective:     /80 (Site: Right Upper Arm, Position: Sitting, Cuff Size: Medium Adult)   Pulse 81   Temp 98.4 °F (36.9 °C) (Tympanic)   Ht 5' 3.5\" (1.613 m)   Wt 194 lb (88 kg)   SpO2 96%   Breastfeeding? No   BMI 33.83 kg/m²     Physical Exam   Constitutional: She is oriented to person, place, and time. Vital signs are normal. She appears well-developed and well-nourished. No distress. Eyes: Conjunctivae and EOM are normal. Right eye exhibits no discharge.  Left problem. Your doctor may have recommended a follow-up visit in the next 8 to 12 hours. If you are not getting better, you may need more tests or treatment. The doctor has checked you carefully, but problems can develop later. If you notice any problems or new symptoms, get medical treatment right away. Follow-up care is a key part of your treatment and safety. Be sure to make and go to all appointments, and call your doctor if you are having problems. It's also a good idea to know your test results and keep a list of the medicines you take. How can you care for yourself at home? · Rest until you feel better. · To prevent dehydration, drink plenty of fluids, enough so that your urine is light yellow or clear like water. Choose water and other caffeine-free clear liquids until you feel better. If you have kidney, heart, or liver disease and have to limit fluids, talk with your doctor before you increase the amount of fluids you drink. · If your stomach is upset, eat mild foods, such as rice, dry toast or crackers, bananas, and applesauce. Try eating several small meals instead of two or three large ones. · Wait until 48 hours after all symptoms have gone away before you have spicy foods, alcohol, and drinks that contain caffeine. · Do not eat foods that are high in fat. · Avoid anti-inflammatory medicines such as aspirin, ibuprofen (Advil, Motrin), and naproxen (Aleve). These can cause stomach upset. Talk to your doctor if you take daily aspirin for another health problem. When should you call for help? Call 911 anytime you think you may need emergency care.  For example, call if:    · You passed out (lost consciousness).     · You pass maroon or very bloody stools.     · You vomit blood or what looks like coffee grounds.     · You have new, severe belly pain.    Call your doctor now or seek immediate medical care if:    · Your pain gets worse, especially if it becomes focused in one area of your belly.     · You have a new or higher fever.     · Your stools are black and look like tar, or they have streaks of blood.     · You have unexpected vaginal bleeding.     · You have symptoms of a urinary tract infection. These may include:  ? Pain when you urinate. ? Urinating more often than usual.  ? Blood in your urine.     · You are dizzy or lightheaded, or you feel like you may faint.    Watch closely for changes in your health, and be sure to contact your doctor if:    · You are not getting better after 1 day (24 hours). Where can you learn more? Go to https://PelagopeDynamic IT Management Serviceseb.[employer] Online. org and sign in to your [employer] account. Enter T356 in the [employer] box to learn more about \"Abdominal Pain: Care Instructions. \"     If you do not have an account, please click on the \"Sign Up Now\" link. Current as of: September 23, 2018  Content Version: 12.1  © 0776-7604 [employer]. Care instructions adapted under license by [facility] Endorse.me [facility] ([facility]). If you have questions about a medical condition or this instruction, always ask your healthcare professional. [address] [address] any warranty or liability for your use of this information. Patient/Caregiver instructed on use, benefit, and side effects of prescribed medications. All patient/parent/caregiver questions answered. Patient/parent/caregiver voiced understanding. Reviewed health maintenance. Instructed to continue current medications, diet and exercise. Patient agreed with treatment plan. Follow up as directed.            Electronically signed by MIKE Mirza CNP on9/4/2019

## 2019-09-23 ENCOUNTER — OFFICE VISIT (OUTPATIENT)
Dept: FAMILY MEDICINE CLINIC | Age: 64
End: 2019-09-23
Payer: COMMERCIAL

## 2019-09-23 VITALS
OXYGEN SATURATION: 95 % | SYSTOLIC BLOOD PRESSURE: 130 MMHG | HEART RATE: 73 BPM | DIASTOLIC BLOOD PRESSURE: 84 MMHG | WEIGHT: 199 LBS | BODY MASS INDEX: 34.7 KG/M2

## 2019-09-23 DIAGNOSIS — D36.9 TUBULOVILLOUS ADENOMA: ICD-10-CM

## 2019-09-23 DIAGNOSIS — Z98.890 S/P GASTROINTESTINAL SURGERY: ICD-10-CM

## 2019-09-23 DIAGNOSIS — E11.9 TYPE 2 DIABETES MELLITUS WITHOUT COMPLICATION, WITHOUT LONG-TERM CURRENT USE OF INSULIN (HCC): Primary | ICD-10-CM

## 2019-09-23 DIAGNOSIS — Z78.0 ASYMPTOMATIC MENOPAUSAL STATE: ICD-10-CM

## 2019-09-23 DIAGNOSIS — R10.11 RUQ PAIN: ICD-10-CM

## 2019-09-23 LAB
CREATININE, RANDOM: 25.1 MG/DL (ref 20–370)
HBA1C MFR BLD: 6.2 %
MICROALBUMIN UR-MCNC: <0.6 MG/DL (ref 0–1.7)

## 2019-09-23 PROCEDURE — 4040F PNEUMOC VAC/ADMIN/RCVD: CPT | Performed by: FAMILY MEDICINE

## 2019-09-23 PROCEDURE — G8400 PT W/DXA NO RESULTS DOC: HCPCS | Performed by: FAMILY MEDICINE

## 2019-09-23 PROCEDURE — 1036F TOBACCO NON-USER: CPT | Performed by: FAMILY MEDICINE

## 2019-09-23 PROCEDURE — G8417 CALC BMI ABV UP PARAM F/U: HCPCS | Performed by: FAMILY MEDICINE

## 2019-09-23 PROCEDURE — G8427 DOCREV CUR MEDS BY ELIG CLIN: HCPCS | Performed by: FAMILY MEDICINE

## 2019-09-23 PROCEDURE — 3017F COLORECTAL CA SCREEN DOC REV: CPT | Performed by: FAMILY MEDICINE

## 2019-09-23 PROCEDURE — 83036 HEMOGLOBIN GLYCOSYLATED A1C: CPT | Performed by: FAMILY MEDICINE

## 2019-09-23 PROCEDURE — 2022F DILAT RTA XM EVC RTNOPTHY: CPT | Performed by: FAMILY MEDICINE

## 2019-09-23 PROCEDURE — 3044F HG A1C LEVEL LT 7.0%: CPT | Performed by: FAMILY MEDICINE

## 2019-09-23 PROCEDURE — 99214 OFFICE O/P EST MOD 30 MIN: CPT | Performed by: FAMILY MEDICINE

## 2019-09-23 PROCEDURE — 1090F PRES/ABSN URINE INCON ASSESS: CPT | Performed by: FAMILY MEDICINE

## 2019-09-23 PROCEDURE — 1123F ACP DISCUSS/DSCN MKR DOCD: CPT | Performed by: FAMILY MEDICINE

## 2019-09-23 RX ORDER — PANTOPRAZOLE SODIUM 40 MG/1
40 TABLET, DELAYED RELEASE ORAL
Qty: 90 TABLET | Refills: 3 | Status: SHIPPED | OUTPATIENT
Start: 2019-09-23 | End: 2019-12-09 | Stop reason: SDUPTHER

## 2019-09-23 RX ORDER — CETIRIZINE HYDROCHLORIDE 10 MG/1
10 TABLET ORAL DAILY
COMMUNITY

## 2019-09-23 ASSESSMENT — ENCOUNTER SYMPTOMS
VOMITING: 0
DIARRHEA: 0
COUGH: 0
SHORTNESS OF BREATH: 0
CONSTIPATION: 0
BLOOD IN STOOL: 0
WHEEZING: 0
NAUSEA: 0

## 2019-10-01 DIAGNOSIS — Z78.0 ASYMPTOMATIC MENOPAUSAL STATE: ICD-10-CM

## 2019-10-03 ENCOUNTER — TELEPHONE (OUTPATIENT)
Dept: FAMILY MEDICINE CLINIC | Age: 64
End: 2019-10-03

## 2019-10-03 DIAGNOSIS — R92.8 ABNORMAL MAMMOGRAM OF RIGHT BREAST: Primary | ICD-10-CM

## 2019-10-09 DIAGNOSIS — R92.8 ABNORMAL MAMMOGRAM OF RIGHT BREAST: ICD-10-CM

## 2019-12-09 DIAGNOSIS — R10.11 RUQ PAIN: ICD-10-CM

## 2019-12-09 RX ORDER — PANTOPRAZOLE SODIUM 40 MG/1
40 TABLET, DELAYED RELEASE ORAL
Qty: 90 TABLET | Refills: 3 | Status: SHIPPED | OUTPATIENT
Start: 2019-12-09 | End: 2020-03-25 | Stop reason: SDUPTHER

## 2020-01-08 ENCOUNTER — OFFICE VISIT (OUTPATIENT)
Dept: FAMILY MEDICINE CLINIC | Age: 65
End: 2020-01-08
Payer: COMMERCIAL

## 2020-01-08 VITALS
WEIGHT: 207.8 LBS | TEMPERATURE: 98.1 F | DIASTOLIC BLOOD PRESSURE: 80 MMHG | SYSTOLIC BLOOD PRESSURE: 138 MMHG | HEIGHT: 64 IN | HEART RATE: 74 BPM | BODY MASS INDEX: 35.48 KG/M2 | OXYGEN SATURATION: 99 %

## 2020-01-08 PROCEDURE — G8417 CALC BMI ABV UP PARAM F/U: HCPCS | Performed by: NURSE PRACTITIONER

## 2020-01-08 PROCEDURE — G8427 DOCREV CUR MEDS BY ELIG CLIN: HCPCS | Performed by: NURSE PRACTITIONER

## 2020-01-08 PROCEDURE — 1090F PRES/ABSN URINE INCON ASSESS: CPT | Performed by: NURSE PRACTITIONER

## 2020-01-08 PROCEDURE — 99213 OFFICE O/P EST LOW 20 MIN: CPT | Performed by: NURSE PRACTITIONER

## 2020-01-08 PROCEDURE — 3017F COLORECTAL CA SCREEN DOC REV: CPT | Performed by: NURSE PRACTITIONER

## 2020-01-08 PROCEDURE — 1036F TOBACCO NON-USER: CPT | Performed by: NURSE PRACTITIONER

## 2020-01-08 PROCEDURE — 4040F PNEUMOC VAC/ADMIN/RCVD: CPT | Performed by: NURSE PRACTITIONER

## 2020-01-08 PROCEDURE — 1123F ACP DISCUSS/DSCN MKR DOCD: CPT | Performed by: NURSE PRACTITIONER

## 2020-01-08 PROCEDURE — G8400 PT W/DXA NO RESULTS DOC: HCPCS | Performed by: NURSE PRACTITIONER

## 2020-01-08 PROCEDURE — G8484 FLU IMMUNIZE NO ADMIN: HCPCS | Performed by: NURSE PRACTITIONER

## 2020-01-08 ASSESSMENT — PATIENT HEALTH QUESTIONNAIRE - PHQ9
SUM OF ALL RESPONSES TO PHQ QUESTIONS 1-9: 0
2. FEELING DOWN, DEPRESSED OR HOPELESS: 0
SUM OF ALL RESPONSES TO PHQ9 QUESTIONS 1 & 2: 0
SUM OF ALL RESPONSES TO PHQ QUESTIONS 1-9: 0
1. LITTLE INTEREST OR PLEASURE IN DOING THINGS: 0

## 2020-01-08 ASSESSMENT — ENCOUNTER SYMPTOMS: ABDOMINAL PAIN: 1

## 2020-01-08 NOTE — PATIENT INSTRUCTIONS
that are high in fat. · Avoid anti-inflammatory medicines such as aspirin, ibuprofen (Advil, Motrin), and naproxen (Aleve). These can cause stomach upset. Talk to your doctor if you take daily aspirin for another health problem. When should you call for help? Call 911 anytime you think you may need emergency care. For example, call if:    · You passed out (lost consciousness).     · You pass maroon or very bloody stools.     · You vomit blood or what looks like coffee grounds.     · You have new, severe belly pain.    Call your doctor now or seek immediate medical care if:    · Your pain gets worse, especially if it becomes focused in one area of your belly.     · You have a new or higher fever.     · Your stools are black and look like tar, or they have streaks of blood.     · You have unexpected vaginal bleeding.     · You have symptoms of a urinary tract infection. These may include:  ? Pain when you urinate. ? Urinating more often than usual.  ? Blood in your urine.     · You are dizzy or lightheaded, or you feel like you may faint.    Watch closely for changes in your health, and be sure to contact your doctor if:    · You are not getting better after 1 day (24 hours). Where can you learn more? Go to https://Friendfer.[employer]. org and sign in to your [employer] account. Enter C696 in the [employer] box to learn more about \"Abdominal Pain: Care Instructions. \"     If you do not have an account, please click on the \"Sign Up Now\" link. Current as of: June 26, 2019  Content Version: 12.3  © 0409-5813 [employer]wise[employer] Incorporated. Care instructions adapted under license by [facility] ([facility]). If you have questions about a medical condition or this instruction, always ask your healthcare professional. [address] any warranty or liability for your use of this information.

## 2020-01-08 NOTE — PROGRESS NOTES
ampullary polyp resection 6/10/19     Family History   Problem Relation Age of Onset    Coronary Art Dis Father     Diabetes Father     High Cholesterol Father     Other Father         colon polyp     Social History     Tobacco Use    Smoking status: Former Smoker     Packs/day: 1.00     Years: 30.00     Pack years: 30.00     Types: Cigarettes     Last attempt to quit: 2014     Years since quittin.0    Smokeless tobacco: Never Used   Substance Use Topics    Alcohol use: Not on file      Current Outpatient Medications   Medication Sig Dispense Refill    pantoprazole (PROTONIX) 40 MG tablet Take 1 tablet by mouth every morning (before breakfast) 90 tablet 3    Cholecalciferol (VITAMIN D3) 1000 units CAPS Take 2,000 Units by mouth daily       Probiotic Product (PROBIOTIC ADVANCED PO) Take by mouth      Multiple Vitamins-Minerals (BEROCCA PO) Take by mouth daily      cetirizine (ZYRTEC) 10 MG tablet Take 10 mg by mouth daily       No current facility-administered medications for this visit. Allergies   Allergen Reactions    Zithromax [Azithromycin]      nausea       No exam data present    Subjective:      Review of Systems   Gastrointestinal: Positive for abdominal pain. Objective:     /80 (Site: Right Upper Arm, Position: Sitting, Cuff Size: Large Adult)   Pulse 74   Temp 98.1 °F (36.7 °C) (Tympanic)   Ht 5' 4\" (1.626 m)   Wt 207 lb 12.8 oz (94.3 kg)   SpO2 99%   Breastfeeding? No   BMI 35.67 kg/m²     Physical Exam    Assessment:      Diagnosis Orders   1. RUQ pain  US ABDOMEN LIMITED   2. Right upper quadrant abdominal mass  US ABDOMEN LIMITED     No results found for this visit on 20. Plan:       Have ultrasound completed. I will call you with results. Follow up with primary care provider in 1 to 2 days if needed. There are no Patient Instructions on file for this visit.   Patient/Caregiver instructed on use, benefit, and side effects of

## 2020-03-10 LAB
CHOLESTEROL/HDL RATIO: 3.7 RATIO (ref 0–4.5)
CHOLESTEROL: 196 MG/DL (ref 50–200)
HDLC SERPL-MCNC: 53 MG/DL (ref 36–68)
LDL CHOLESTEROL CALCULATED: 119.4 MG/DL (ref 0–160)
TRIGL SERPL-MCNC: 118 MG/DL (ref 10–250)
VLDLC SERPL CALC-MCNC: 24 MG/DL (ref 0–40)

## 2020-03-13 ENCOUNTER — TELEPHONE (OUTPATIENT)
Dept: FAMILY MEDICINE CLINIC | Age: 65
End: 2020-03-13

## 2020-03-13 NOTE — TELEPHONE ENCOUNTER
Needs breast biopsy ordered for abnormal mammogram of right breast will need referral to gen surgery in [place]    Printed

## 2020-03-13 NOTE — TELEPHONE ENCOUNTER
Syd Benson from [facility] called wondering if pt had a breast biopsy done. I did not see one. She says we need to order one for her.

## 2020-03-16 ENCOUNTER — HOSPITAL ENCOUNTER (OUTPATIENT)
Dept: MAMMOGRAPHY | Age: 65
Discharge: HOME | End: 2020-03-18
Payer: COMMERCIAL

## 2020-03-16 ENCOUNTER — TELEPHONE (OUTPATIENT)
Dept: FAMILY MEDICINE CLINIC | Age: 65
End: 2020-03-16

## 2020-03-16 ENCOUNTER — TELEPHONE (OUTPATIENT)
Dept: SURGERY | Age: 65
End: 2020-03-16

## 2020-03-16 PROCEDURE — G0279 TOMOSYNTHESIS, MAMMO: HCPCS

## 2020-03-16 NOTE — TELEPHONE ENCOUNTER
Could you please put in a right Diagnostic mammogram order [id number] Thank you.   Birads 4 in Oct. Our radiologist would like another Leanne Brady since it has been 6 months since last Wm. Ronny Manley;

## 2020-03-16 NOTE — TELEPHONE ENCOUNTER
Spoke with Dr. Fanny Castanon and he suggested to go ahead with stereotactic biopsy and follow after biopsy due to Covid-19 precautions. Patient aware that stereotactic biopsy will be ordered and that Juan Cote will call patient with appointment for biopsy. Patient verbalizes understanding. Patient to call office if any concerns. Orders placed v.o. Dr. Fanny Castanon for Stereotactic biopsy right breast, pathology order and mammogram right breast post clip placement.

## 2020-03-17 ENCOUNTER — HOSPITAL ENCOUNTER (OUTPATIENT)
Age: 65
Setting detail: SPECIMEN
Discharge: HOME | End: 2020-03-17
Payer: COMMERCIAL

## 2020-03-17 ENCOUNTER — HOSPITAL ENCOUNTER (OUTPATIENT)
Dept: MAMMOGRAPHY | Age: 65
Discharge: HOME | End: 2020-03-19
Payer: COMMERCIAL

## 2020-03-17 PROCEDURE — 88305 TISSUE EXAM BY PATHOLOGIST: CPT

## 2020-03-17 PROCEDURE — 19081 BX BREAST 1ST LESION STRTCTC: CPT

## 2020-03-19 ENCOUNTER — TELEPHONE (OUTPATIENT)
Dept: FAMILY MEDICINE CLINIC | Age: 65
End: 2020-03-19

## 2020-03-19 LAB — SURGICAL PATHOLOGY REPORT: NORMAL

## 2020-03-25 RX ORDER — PANTOPRAZOLE SODIUM 40 MG/1
40 TABLET, DELAYED RELEASE ORAL
Qty: 90 TABLET | Refills: 3 | Status: SHIPPED | OUTPATIENT
Start: 2020-03-25 | End: 2021-03-01 | Stop reason: SDUPTHER

## 2020-03-25 NOTE — TELEPHONE ENCOUNTER
[facility] is calling to request a refill on the following medication(s):  Requested Prescriptions     Pending Prescriptions Disp Refills    pantoprazole (PROTONIX) 40 MG tablet 90 tablet 3     Sig: Take 1 tablet by mouth every morning (before breakfast)       Last Visit Date (If Applicable):  1/30/0303    Next Visit Date:    4/14/2020

## 2020-04-08 NOTE — PROGRESS NOTES
[address]  Dept: [phone]  DeptFax: [phone]    Marty Barr is a77 y.o. female who presents today for her medical conditions/complaints as noted below. Marty Barr is c/o of No chief complaint on file. 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 She has had no further omphalitis. She was seen for right upper quadrant pain. Labs are normal including amylase and lipase. Last note from [facility] in July              Non-insulin-dependent diabetes  Checks at least every morning; usually above 120 in the morning but coming down. When she checks later in the day it is better   Currently not taking medications. No ACE, no aspirin, no \"statin\" no recent eye exam.  Today's hemoglobin A1c is 6.2          ASSESSMENT:  Anemia, history of ampullary mass and known diverticular disease  PLAN:  1. EGD and colonoscopy      Electronically signed by Katie Teran MD at 2020 6:41 AM EST      Final Pathologic Diagnosis    1. Stomach, biopsy:        Gastric mucosa with no significant pathologic findings.        Comment: There is no evidence of Helicobacter pylori on H&E sections.        2. . Descending colon, biopsy:        Fragments of tubular adenoma.                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 ([address]     Pancreatic mass 2019    Tubulovillous adenoma obstructing the ampulla of Vater      Past Surgical History:   Procedure Laterality Date     SECTION      x 3    Hib vaccine  Aged Out    Meningococcal (ACWY) vaccine  Aged Out       Lab Results   Component Value Date    K 3.7 08/30/2019    CREATININE 0.8 08/30/2019    AST 23 08/30/2019    ALT 17 08/30/2019    HCT 42.0 08/30/2019    LABA1C 6.2 09/23/2019    MICROALBUR 1.5 01/04/2017    GLUCOSE 139 08/30/2019    CALCIUM 8.9 08/30/2019      Lab Results   Component Value Date    CHOL 196 03/10/2020    CHOL 153 01/04/2017    TRIG 118 03/10/2020    HDL 53 03/10/2020       Subjective:      Review of Systems    Objective: There were no vitals taken for this visit. Physical Exam    Assessment:      Diagnosis Orders   1. Tubulovillous adenoma     2. 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 S/P gastrointestinal surgery     4. Fibroadenoma of breast, right              POC Testing Results (If Applicable):  No results found for this visit on 04/09/20. Plan:         Orders Given:  No orders of the defined types were placed in this encounter. Prescriptions:    No orders of the defined types were placed in this encounter. No follow-ups on file. Electronically signed by Steve Hollis MD on4/8/2020. **This report has been created using voice recognition software. It may contain minor errors which are inherent in voice recognition technology. **

## 2020-04-09 ENCOUNTER — VIRTUAL VISIT (OUTPATIENT)
Dept: FAMILY MEDICINE CLINIC | Age: 65
End: 2020-04-09
Payer: COMMERCIAL

## 2020-04-09 VITALS — DIASTOLIC BLOOD PRESSURE: 72 MMHG | SYSTOLIC BLOOD PRESSURE: 127 MMHG

## 2020-04-09 PROCEDURE — G8427 DOCREV CUR MEDS BY ELIG CLIN: HCPCS | Performed by: FAMILY MEDICINE

## 2020-04-09 PROCEDURE — 4040F PNEUMOC VAC/ADMIN/RCVD: CPT | Performed by: FAMILY MEDICINE

## 2020-04-09 PROCEDURE — 1123F ACP DISCUSS/DSCN MKR DOCD: CPT | Performed by: FAMILY MEDICINE

## 2020-04-09 PROCEDURE — 3017F COLORECTAL CA SCREEN DOC REV: CPT | Performed by: FAMILY MEDICINE

## 2020-04-09 PROCEDURE — 3046F HEMOGLOBIN A1C LEVEL >9.0%: CPT | Performed by: FAMILY MEDICINE

## 2020-04-09 PROCEDURE — G8400 PT W/DXA NO RESULTS DOC: HCPCS | Performed by: FAMILY MEDICINE

## 2020-04-09 PROCEDURE — 99213 OFFICE O/P EST LOW 20 MIN: CPT | Performed by: FAMILY MEDICINE

## 2020-04-09 PROCEDURE — 2022F DILAT RTA XM EVC RTNOPTHY: CPT | Performed by: FAMILY MEDICINE

## 2020-04-09 PROCEDURE — 1090F PRES/ABSN URINE INCON ASSESS: CPT | Performed by: FAMILY MEDICINE

## 2020-04-09 ASSESSMENT — ENCOUNTER SYMPTOMS
COUGH: 0
SHORTNESS OF BREATH: 0
BLOOD IN STOOL: 0
CONSTIPATION: 0
NAUSEA: 0
VOMITING: 0
WHEEZING: 0
DIARRHEA: 0

## 2020-06-23 ENCOUNTER — HOSPITAL ENCOUNTER (OUTPATIENT)
Dept: LAB | Age: 65
Discharge: HOME | End: 2020-06-23
Payer: COMMERCIAL

## 2020-06-23 ENCOUNTER — HOSPITAL ENCOUNTER (OUTPATIENT)
Dept: GENERAL RADIOLOGY | Age: 65
Discharge: HOME | End: 2020-06-25
Payer: COMMERCIAL

## 2020-06-23 ENCOUNTER — HOSPITAL ENCOUNTER (OUTPATIENT)
Dept: NON INVASIVE DIAGNOSTICS | Age: 65
Discharge: HOME | End: 2020-06-23
Payer: COMMERCIAL

## 2020-06-23 ENCOUNTER — OFFICE VISIT (OUTPATIENT)
Dept: SURGERY | Age: 65
End: 2020-06-23
Payer: COMMERCIAL

## 2020-06-23 VITALS
HEART RATE: 70 BPM | TEMPERATURE: 96.7 F | HEIGHT: 64 IN | BODY MASS INDEX: 35.13 KG/M2 | WEIGHT: 205.8 LBS | DIASTOLIC BLOOD PRESSURE: 86 MMHG | SYSTOLIC BLOOD PRESSURE: 130 MMHG

## 2020-06-23 LAB
ABSOLUTE EOS #: 0.13 K/UL (ref 0–0.44)
ABSOLUTE IMMATURE GRANULOCYTE: <0.03 K/UL (ref 0–0.3)
ABSOLUTE LYMPH #: 2.43 K/UL (ref 1.1–3.7)
ABSOLUTE MONO #: 0.5 K/UL (ref 0.1–1.2)
ANION GAP SERPL CALCULATED.3IONS-SCNC: 11 MMOL/L (ref 9–17)
BASOPHILS # BLD: 1 % (ref 0–2)
BASOPHILS ABSOLUTE: 0.03 K/UL (ref 0–0.2)
BUN BLDV-MCNC: 16 MG/DL (ref 8–23)
BUN/CREAT BLD: 24 (ref 9–20)
CALCIUM SERPL-MCNC: 9.1 MG/DL (ref 8.6–10.4)
CHLORIDE BLD-SCNC: 104 MMOL/L (ref 98–107)
CO2: 27 MMOL/L (ref 20–31)
CREAT SERPL-MCNC: 0.66 MG/DL (ref 0.5–0.9)
DIFFERENTIAL TYPE: NORMAL
EOSINOPHILS RELATIVE PERCENT: 2 % (ref 1–4)
GFR AFRICAN AMERICAN: >60 ML/MIN
GFR NON-AFRICAN AMERICAN: >60 ML/MIN
GFR SERPL CREATININE-BSD FRML MDRD: ABNORMAL ML/MIN/{1.73_M2}
GFR SERPL CREATININE-BSD FRML MDRD: ABNORMAL ML/MIN/{1.73_M2}
GLUCOSE BLD-MCNC: 103 MG/DL (ref 70–99)
HCT VFR BLD CALC: 41 % (ref 36.3–47.1)
HEMOGLOBIN: 13.5 G/DL (ref 11.9–15.1)
IMMATURE GRANULOCYTES: 0 %
LYMPHOCYTES # BLD: 37 % (ref 24–43)
MCH RBC QN AUTO: 30.3 PG (ref 25.2–33.5)
MCHC RBC AUTO-ENTMCNC: 32.9 G/DL (ref 25.2–33.5)
MCV RBC AUTO: 91.9 FL (ref 82.6–102.9)
MONOCYTES # BLD: 8 % (ref 3–12)
NRBC AUTOMATED: 0 PER 100 WBC
PDW BLD-RTO: 13.2 % (ref 11.8–14.4)
PLATELET # BLD: 240 K/UL (ref 138–453)
PLATELET ESTIMATE: NORMAL
PMV BLD AUTO: 10.5 FL (ref 8.1–13.5)
POTASSIUM SERPL-SCNC: 4.3 MMOL/L (ref 3.7–5.3)
RBC # BLD: 4.46 M/UL (ref 3.95–5.11)
RBC # BLD: NORMAL 10*6/UL
SEG NEUTROPHILS: 52 % (ref 36–65)
SEGMENTED NEUTROPHILS ABSOLUTE COUNT: 3.44 K/UL (ref 1.5–8.1)
SODIUM BLD-SCNC: 142 MMOL/L (ref 135–144)
WBC # BLD: 6.6 K/UL (ref 3.5–11.3)
WBC # BLD: NORMAL 10*3/UL

## 2020-06-23 PROCEDURE — 4040F PNEUMOC VAC/ADMIN/RCVD: CPT | Performed by: SURGERY

## 2020-06-23 PROCEDURE — 93005 ELECTROCARDIOGRAM TRACING: CPT

## 2020-06-23 PROCEDURE — 99213 OFFICE O/P EST LOW 20 MIN: CPT | Performed by: SURGERY

## 2020-06-23 PROCEDURE — 85025 COMPLETE CBC W/AUTO DIFF WBC: CPT

## 2020-06-23 PROCEDURE — G8427 DOCREV CUR MEDS BY ELIG CLIN: HCPCS | Performed by: SURGERY

## 2020-06-23 PROCEDURE — 80048 BASIC METABOLIC PNL TOTAL CA: CPT

## 2020-06-23 PROCEDURE — G8417 CALC BMI ABV UP PARAM F/U: HCPCS | Performed by: SURGERY

## 2020-06-23 PROCEDURE — 71046 X-RAY EXAM CHEST 2 VIEWS: CPT

## 2020-06-23 PROCEDURE — 1036F TOBACCO NON-USER: CPT | Performed by: SURGERY

## 2020-06-23 PROCEDURE — G8400 PT W/DXA NO RESULTS DOC: HCPCS | Performed by: SURGERY

## 2020-06-23 PROCEDURE — 1123F ACP DISCUSS/DSCN MKR DOCD: CPT | Performed by: SURGERY

## 2020-06-23 PROCEDURE — 3017F COLORECTAL CA SCREEN DOC REV: CPT | Performed by: SURGERY

## 2020-06-23 PROCEDURE — 36415 COLL VENOUS BLD VENIPUNCTURE: CPT

## 2020-06-23 PROCEDURE — 1090F PRES/ABSN URINE INCON ASSESS: CPT | Performed by: SURGERY

## 2020-06-24 ENCOUNTER — TELEPHONE (OUTPATIENT)
Dept: SURGERY | Age: 65
End: 2020-06-24

## 2020-06-24 LAB
EKG ATRIAL RATE: 63 BPM
EKG P AXIS: 65 DEGREES
EKG P-R INTERVAL: 158 MS
EKG Q-T INTERVAL: 408 MS
EKG QRS DURATION: 76 MS
EKG QTC CALCULATION (BAZETT): 417 MS
EKG R AXIS: -14 DEGREES
EKG T AXIS: 56 DEGREES
EKG VENTRICULAR RATE: 63 BPM

## 2020-06-24 NOTE — TELEPHONE ENCOUNTER
[facility]    Pre-Operative Evaluation/Consultation    Name:  Tara Gil                                         Age:  75 y.o. MRN:  [id number]       :  1955   Date:  2020         Sex: female    There were no encounter diagnoses. Surgeon:  Dr. Slava Sahu  Procedure (Planned):  Right breast biopsy with needle placement    Date Scheduled surgery: 7/15/20    Attending : No att. 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 ([address]     Tubulovillous adenoma 2019    Ampulla of Vater     Past Surgical History:   Procedure Laterality Date     SECTION      x 3    CHOLECYSTECTOMY      laparoscopic    COLONOSCOPY  10/2006    COLONOSCOPY      Dr Livia Liriano; tubular adenoma     COLONOSCOPY  2020    Blood adenoma- Dr Earnest Chavira    ERCP      with biopsy    PANCREAS SURGERY      s/p transduodenal ampullary polyp resection 6/10/19    UPPER GASTROINTESTINAL ENDOSCOPY  2020     Social History     Tobacco Use    Smoking status: Former Smoker     Packs/day: 1.00     Years: 30.00     Pack years: 30.00     Types: Cigarettes     Last attempt to quit: 2014     Years since quittin.4    Smokeless tobacco: Never Used   Substance Use Topics    Alcohol use: Not on file    Drug use: Not on file     Medications:  Current Outpatient Medications   Medication Sig Dispense Refill    pantoprazole (PROTONIX) 40 MG tablet Take 1 tablet by mouth every

## 2020-07-07 ENCOUNTER — PRE-PROCEDURE TELEPHONE (OUTPATIENT)
Dept: PREADMISSION TESTING | Age: 65
End: 2020-07-07

## 2020-07-07 NOTE — TELEPHONE ENCOUNTER
Spoke with patient regarding a need for a covid test prior to her procedure on 7/15. Scheduled patient for 7/10 @ 7:45 am.  Gave patient directions and she verbalized understanding.

## 2020-07-10 ENCOUNTER — HOSPITAL ENCOUNTER (OUTPATIENT)
Dept: PREADMISSION TESTING | Age: 65
Setting detail: SPECIMEN
Discharge: HOME | End: 2020-07-14
Payer: COMMERCIAL

## 2020-07-10 PROCEDURE — U0004 COV-19 TEST NON-CDC HGH THRU: HCPCS

## 2020-07-11 LAB
SARS-COV-2, PCR: NOT DETECTED
SARS-COV-2, RAPID: NORMAL
SARS-COV-2: NORMAL
SOURCE: NORMAL

## 2020-07-12 ENCOUNTER — TELEPHONE (OUTPATIENT)
Dept: PRIMARY CARE CLINIC | Age: 65
End: 2020-07-12

## 2020-07-15 ENCOUNTER — ANESTHESIA (OUTPATIENT)
Dept: OPERATING ROOM | Age: 65
End: 2020-07-15
Payer: COMMERCIAL

## 2020-07-15 ENCOUNTER — APPOINTMENT (OUTPATIENT)
Dept: MAMMOGRAPHY | Age: 65
End: 2020-07-15
Attending: SURGERY
Payer: COMMERCIAL

## 2020-07-15 ENCOUNTER — ANESTHESIA EVENT (OUTPATIENT)
Dept: OPERATING ROOM | Age: 65
End: 2020-07-15
Payer: COMMERCIAL

## 2020-07-15 ENCOUNTER — HOSPITAL ENCOUNTER (OUTPATIENT)
Age: 65
Setting detail: OUTPATIENT SURGERY
Discharge: HOME | End: 2020-07-15
Attending: SURGERY | Admitting: SURGERY
Payer: COMMERCIAL

## 2020-07-15 VITALS
OXYGEN SATURATION: 97 % | SYSTOLIC BLOOD PRESSURE: 92 MMHG | RESPIRATION RATE: 13 BRPM | DIASTOLIC BLOOD PRESSURE: 51 MMHG

## 2020-07-15 VITALS
DIASTOLIC BLOOD PRESSURE: 55 MMHG | HEIGHT: 64 IN | OXYGEN SATURATION: 94 % | SYSTOLIC BLOOD PRESSURE: 136 MMHG | HEART RATE: 63 BPM | TEMPERATURE: 97 F | BODY MASS INDEX: 34.83 KG/M2 | RESPIRATION RATE: 14 BRPM | WEIGHT: 204 LBS

## 2020-07-15 PROCEDURE — 2580000003 HC RX 258: Performed by: NURSE ANESTHETIST, CERTIFIED REGISTERED

## 2020-07-15 PROCEDURE — 6360000002 HC RX W HCPCS: Performed by: NURSE ANESTHETIST, CERTIFIED REGISTERED

## 2020-07-15 PROCEDURE — 19125 EXCISION BREAST LESION: CPT | Performed by: SURGERY

## 2020-07-15 PROCEDURE — 6360000002 HC RX W HCPCS: Performed by: SURGERY

## 2020-07-15 PROCEDURE — 2720000010 HC SURG SUPPLY STERILE: Performed by: SURGERY

## 2020-07-15 PROCEDURE — 2709999900 HC NON-CHARGEABLE SUPPLY: Performed by: SURGERY

## 2020-07-15 PROCEDURE — 7100000000 HC PACU RECOVERY - FIRST 15 MIN: Performed by: SURGERY

## 2020-07-15 PROCEDURE — 3600000002 HC SURGERY LEVEL 2 BASE: Performed by: SURGERY

## 2020-07-15 PROCEDURE — 2500000003 HC RX 250 WO HCPCS: Performed by: SURGERY

## 2020-07-15 PROCEDURE — C1819 TISSUE LOCALIZATION-EXCISION: HCPCS

## 2020-07-15 PROCEDURE — 7100000010 HC PHASE II RECOVERY - FIRST 15 MIN: Performed by: SURGERY

## 2020-07-15 PROCEDURE — 2500000003 HC RX 250 WO HCPCS

## 2020-07-15 PROCEDURE — 3700000000 HC ANESTHESIA ATTENDED CARE: Performed by: SURGERY

## 2020-07-15 PROCEDURE — 7100000001 HC PACU RECOVERY - ADDTL 15 MIN: Performed by: SURGERY

## 2020-07-15 PROCEDURE — 7100000011 HC PHASE II RECOVERY - ADDTL 15 MIN: Performed by: SURGERY

## 2020-07-15 PROCEDURE — 88307 TISSUE EXAM BY PATHOLOGIST: CPT

## 2020-07-15 PROCEDURE — 6370000000 HC RX 637 (ALT 250 FOR IP): Performed by: SURGERY

## 2020-07-15 PROCEDURE — 2500000003 HC RX 250 WO HCPCS: Performed by: NURSE ANESTHETIST, CERTIFIED REGISTERED

## 2020-07-15 PROCEDURE — 3700000001 HC ADD 15 MINUTES (ANESTHESIA): Performed by: SURGERY

## 2020-07-15 PROCEDURE — 3600000012 HC SURGERY LEVEL 2 ADDTL 15MIN: Performed by: SURGERY

## 2020-07-15 PROCEDURE — 76098 X-RAY EXAM SURGICAL SPECIMEN: CPT

## 2020-07-15 RX ORDER — KETOROLAC TROMETHAMINE 30 MG/ML
INJECTION, SOLUTION INTRAMUSCULAR; INTRAVENOUS PRN
Status: DISCONTINUED | OUTPATIENT
Start: 2020-07-15 | End: 2020-07-15 | Stop reason: SDUPTHER

## 2020-07-15 RX ORDER — LIDOCAINE HYDROCHLORIDE 20 MG/ML
INJECTION, SOLUTION EPIDURAL; INFILTRATION; INTRACAUDAL; PERINEURAL PRN
Status: DISCONTINUED | OUTPATIENT
Start: 2020-07-15 | End: 2020-07-15 | Stop reason: SDUPTHER

## 2020-07-15 RX ORDER — HYDROCODONE BITARTRATE AND ACETAMINOPHEN 5; 325 MG/1; MG/1
1 TABLET ORAL PRN
Status: COMPLETED | OUTPATIENT
Start: 2020-07-15 | End: 2020-07-15

## 2020-07-15 RX ORDER — HYDROCODONE BITARTRATE AND ACETAMINOPHEN 5; 325 MG/1; MG/1
1 TABLET ORAL EVERY 6 HOURS PRN
Qty: 28 TABLET | Refills: 0 | Status: SHIPPED | OUTPATIENT
Start: 2020-07-15 | End: 2020-07-22

## 2020-07-15 RX ORDER — FENTANYL CITRATE 50 UG/ML
50 INJECTION, SOLUTION INTRAMUSCULAR; INTRAVENOUS EVERY 5 MIN PRN
Status: DISCONTINUED | OUTPATIENT
Start: 2020-07-15 | End: 2020-07-15 | Stop reason: HOSPADM

## 2020-07-15 RX ORDER — ONDANSETRON 2 MG/ML
INJECTION INTRAMUSCULAR; INTRAVENOUS PRN
Status: DISCONTINUED | OUTPATIENT
Start: 2020-07-15 | End: 2020-07-15 | Stop reason: SDUPTHER

## 2020-07-15 RX ORDER — SODIUM CHLORIDE 0.9 % (FLUSH) 0.9 %
10 SYRINGE (ML) INJECTION EVERY 12 HOURS SCHEDULED
Status: DISCONTINUED | OUTPATIENT
Start: 2020-07-15 | End: 2020-07-15 | Stop reason: HOSPADM

## 2020-07-15 RX ORDER — SODIUM CHLORIDE 0.9 % (FLUSH) 0.9 %
10 SYRINGE (ML) INJECTION PRN
Status: DISCONTINUED | OUTPATIENT
Start: 2020-07-15 | End: 2020-07-15 | Stop reason: HOSPADM

## 2020-07-15 RX ORDER — HYDROCODONE BITARTRATE AND ACETAMINOPHEN 5; 325 MG/1; MG/1
2 TABLET ORAL PRN
Status: COMPLETED | OUTPATIENT
Start: 2020-07-15 | End: 2020-07-15

## 2020-07-15 RX ORDER — PROPOFOL 10 MG/ML
INJECTION, EMULSION INTRAVENOUS PRN
Status: DISCONTINUED | OUTPATIENT
Start: 2020-07-15 | End: 2020-07-15 | Stop reason: SDUPTHER

## 2020-07-15 RX ORDER — FENTANYL CITRATE 50 UG/ML
25 INJECTION, SOLUTION INTRAMUSCULAR; INTRAVENOUS EVERY 5 MIN PRN
Status: DISCONTINUED | OUTPATIENT
Start: 2020-07-15 | End: 2020-07-15 | Stop reason: HOSPADM

## 2020-07-15 RX ORDER — ONDANSETRON 2 MG/ML
4 INJECTION INTRAMUSCULAR; INTRAVENOUS
Status: DISCONTINUED | OUTPATIENT
Start: 2020-07-15 | End: 2020-07-15 | Stop reason: HOSPADM

## 2020-07-15 RX ORDER — MORPHINE SULFATE 2 MG/ML
1 INJECTION, SOLUTION INTRAMUSCULAR; INTRAVENOUS EVERY 5 MIN PRN
Status: DISCONTINUED | OUTPATIENT
Start: 2020-07-15 | End: 2020-07-15 | Stop reason: HOSPADM

## 2020-07-15 RX ORDER — MORPHINE SULFATE 2 MG/ML
2 INJECTION, SOLUTION INTRAMUSCULAR; INTRAVENOUS EVERY 5 MIN PRN
Status: DISCONTINUED | OUTPATIENT
Start: 2020-07-15 | End: 2020-07-15 | Stop reason: HOSPADM

## 2020-07-15 RX ORDER — FENTANYL CITRATE 50 UG/ML
INJECTION, SOLUTION INTRAMUSCULAR; INTRAVENOUS PRN
Status: DISCONTINUED | OUTPATIENT
Start: 2020-07-15 | End: 2020-07-15 | Stop reason: SDUPTHER

## 2020-07-15 RX ORDER — MEPERIDINE HYDROCHLORIDE 50 MG/ML
12.5 INJECTION INTRAMUSCULAR; INTRAVENOUS; SUBCUTANEOUS EVERY 5 MIN PRN
Status: DISCONTINUED | OUTPATIENT
Start: 2020-07-15 | End: 2020-07-15 | Stop reason: HOSPADM

## 2020-07-15 RX ORDER — SODIUM CHLORIDE, SODIUM LACTATE, POTASSIUM CHLORIDE, CALCIUM CHLORIDE 600; 310; 30; 20 MG/100ML; MG/100ML; MG/100ML; MG/100ML
INJECTION, SOLUTION INTRAVENOUS CONTINUOUS PRN
Status: DISCONTINUED | OUTPATIENT
Start: 2020-07-15 | End: 2020-07-15 | Stop reason: SDUPTHER

## 2020-07-15 RX ORDER — DEXAMETHASONE SODIUM PHOSPHATE 10 MG/ML
INJECTION INTRAMUSCULAR; INTRAVENOUS PRN
Status: DISCONTINUED | OUTPATIENT
Start: 2020-07-15 | End: 2020-07-15 | Stop reason: SDUPTHER

## 2020-07-15 RX ORDER — DIPHENHYDRAMINE HYDROCHLORIDE 50 MG/ML
12.5 INJECTION INTRAMUSCULAR; INTRAVENOUS
Status: DISCONTINUED | OUTPATIENT
Start: 2020-07-15 | End: 2020-07-15 | Stop reason: HOSPADM

## 2020-07-15 RX ADMIN — PROPOFOL 150 MG: 10 INJECTION, EMULSION INTRAVENOUS at 11:43

## 2020-07-15 RX ADMIN — ONDANSETRON 4 MG: 2 INJECTION INTRAMUSCULAR; INTRAVENOUS at 12:28

## 2020-07-15 RX ADMIN — Medication 2 G: at 11:54

## 2020-07-15 RX ADMIN — LIDOCAINE HYDROCHLORIDE 100 MG: 20 INJECTION, SOLUTION EPIDURAL; INFILTRATION; INTRACAUDAL; PERINEURAL at 11:43

## 2020-07-15 RX ADMIN — DEXAMETHASONE SODIUM PHOSPHATE 10 MG: 10 INJECTION INTRAMUSCULAR; INTRAVENOUS at 11:56

## 2020-07-15 RX ADMIN — SODIUM CHLORIDE, POTASSIUM CHLORIDE, SODIUM LACTATE AND CALCIUM CHLORIDE: 600; 310; 30; 20 INJECTION, SOLUTION INTRAVENOUS at 11:37

## 2020-07-15 RX ADMIN — HYDROCODONE BITARTRATE AND ACETAMINOPHEN 1 TABLET: 5; 325 TABLET ORAL at 13:39

## 2020-07-15 RX ADMIN — FENTANYL CITRATE 50 MCG: 50 INJECTION, SOLUTION INTRAMUSCULAR; INTRAVENOUS at 11:43

## 2020-07-15 RX ADMIN — KETOROLAC TROMETHAMINE 30 MG: 30 INJECTION, SOLUTION INTRAMUSCULAR; INTRAVENOUS at 12:05

## 2020-07-15 ASSESSMENT — PULMONARY FUNCTION TESTS
PIF_VALUE: 8
PIF_VALUE: 9
PIF_VALUE: 2
PIF_VALUE: 6
PIF_VALUE: 7
PIF_VALUE: 2
PIF_VALUE: 9
PIF_VALUE: 2
PIF_VALUE: 2
PIF_VALUE: 8
PIF_VALUE: 6
PIF_VALUE: 8
PIF_VALUE: 8
PIF_VALUE: 7
PIF_VALUE: 7
PIF_VALUE: 9
PIF_VALUE: 6
PIF_VALUE: 3
PIF_VALUE: 2
PIF_VALUE: 5
PIF_VALUE: 12
PIF_VALUE: 8
PIF_VALUE: 9
PIF_VALUE: 9
PIF_VALUE: 7
PIF_VALUE: 2
PIF_VALUE: 4
PIF_VALUE: 5
PIF_VALUE: 12
PIF_VALUE: 9
PIF_VALUE: 8
PIF_VALUE: 4
PIF_VALUE: 1
PIF_VALUE: 2
PIF_VALUE: 4
PIF_VALUE: 2
PIF_VALUE: 4
PIF_VALUE: 2
PIF_VALUE: 10
PIF_VALUE: 12
PIF_VALUE: 7
PIF_VALUE: 7
PIF_VALUE: 9
PIF_VALUE: 9
PIF_VALUE: 7
PIF_VALUE: 9

## 2020-07-15 ASSESSMENT — PAIN DESCRIPTION - PAIN TYPE
TYPE: SURGICAL PAIN

## 2020-07-15 ASSESSMENT — PAIN SCALES - GENERAL
PAINLEVEL_OUTOF10: 5
PAINLEVEL_OUTOF10: 3
PAINLEVEL_OUTOF10: 8
PAINLEVEL_OUTOF10: 3
PAINLEVEL_OUTOF10: 4
PAINLEVEL_OUTOF10: 3
PAINLEVEL_OUTOF10: 3
PAINLEVEL_OUTOF10: 2
PAINLEVEL_OUTOF10: 3

## 2020-07-15 ASSESSMENT — PAIN DESCRIPTION - ORIENTATION
ORIENTATION: RIGHT

## 2020-07-15 ASSESSMENT — PAIN - FUNCTIONAL ASSESSMENT: PAIN_FUNCTIONAL_ASSESSMENT: 0-10

## 2020-07-15 ASSESSMENT — PAIN DESCRIPTION - LOCATION
LOCATION: BREAST

## 2020-07-15 ASSESSMENT — PAIN DESCRIPTION - DESCRIPTORS: DESCRIPTORS: ACHING

## 2020-07-15 NOTE — ANESTHESIA PRE PROCEDURE
 PANCREAS SURGERY      s/p transduodenal ampullary polyp resection 6/10/19    UPPER GASTROINTESTINAL ENDOSCOPY  2020       Social History:    Social History     Tobacco Use    Smoking status: Former Smoker     Packs/day: 1.00     Years: 30.00     Pack years: 30.00     Types: Cigarettes     Last attempt to quit: 2014     Years since quittin.5    Smokeless tobacco: Never Used   Substance Use Topics    Alcohol use: Yes     Comment: rarely                                Counseling given: Not Answered      Vital Signs (Current):   Vitals:    07/15/20 0828   BP: (!) 150/67   Pulse: 59   Resp: 16   Temp: 36.1 °C (97 °F)   TempSrc: Temporal   SpO2: 96%   Weight: 204 lb (92.5 kg)   Height: 5' 4\" (1.626 m)                                              BP Readings from Last 3 Encounters:   07/15/20 (!) 150/67   20 130/86   20 127/72       NPO Status: Time of last liquid consumption: 1800                        Time of last solid consumption: 1800                        Date of last liquid consumption: 20                        Date of last solid food consumption: 20    BMI:   Wt Readings from Last 3 Encounters:   07/15/20 204 lb (92.5 kg)   20 205 lb 12.8 oz (93.4 kg)   20 207 lb 12.8 oz (94.3 kg)     Body mass index is 35.02 kg/m².     CBC:   Lab Results   Component Value Date    WBC 6.6 2020    RBC 4.46 2020    RBC 0-2 2019    HGB 13.5 2020    HCT 41.0 2020    MCV 91.9 2020    RDW 13.2 2020     2020       CMP:   Lab Results   Component Value Date     2020    K 4.3 2020     2020    CO2 27 2020    BUN 16 2020    CREATININE 0.66 2020    GFRAA >60 2020    AGRATIO 1.3 2019    LABGLOM >60 2020    GLUCOSE 103 2020    GLUCOSE 139 2019    PROT 6.8 2019    CALCIUM 9.1 2020    BILITOT 0.3 2019    BILITOT neg 2019    ALKPHOS 69

## 2020-07-15 NOTE — H&P
Kevin Rivera is a 75 y.o. female              CC:     Right breast fibroadenoma post stereotactic bx     HISTORY OF PRESENT ILLNESS:     Pt is a 74 yo female that had a stereotactic bx and it came back sclerotic fibroadenoma. She is here to discuss further observation vs excision. No other complaints.               Review of Systems:     General:  Fever: Negative  Weight Change:Negative  Night Sweats: Negative     Eye:  Blurry Vision:Negative  Double Vision: Negative     Ent:  Headaches: Negative  Sore throat: Negative     Allergy/Immunology:  Hives: Negative  Latex allergy: Negative     Hematology/Lymphatic:  Bleeding Problems: Negative  Blood Clots: Negative  Swollen Lymph Nodes: Negative     Lungs:  Cough: Negative  SOB: Negative  Wheezing:Negative     Cardiovascular:  Chest Pain: Negative  Palpitations:Negative     GI:   Decreased Appetite: Negative  Heartburn: Negative  Dysphagia: Negative  Nausea/Vomiting: Negative  Abdominal Pain: Negative  Change in Bowels:Negative  Constipation: Negative  Diarrhea: Negative  Rectal Bleeding: Negative     :   Dysuria: Negative  Increase Urinary Frequency/Urgency: Negative     Neuro:  Seizures: Negative  Confusion: Negative           PAST MEDICAL HISTORY:           Family History         Family History   Problem Relation Age of Onset    Coronary Art Dis Father      Diabetes Father      High Cholesterol Father      Other Father           colon polyp        Social History               Socioeconomic History    Marital status:        Spouse name: Not on file    Number of children: Not on file    Years of education: Not on file    Highest education level: Not on file   Occupational History    Not on file   Social Needs    Financial resource strain: Not on file    Food insecurity       Worry: Not on file       Inability: Not on file    Transportation needs       Medical: Not on file       Non-medical: Not on file   Tobacco Use    Smoking status: Former Smoker       Packs/day: 1.00       Years: 30.00       Pack years: 30.00       Types: Cigarettes       Last attempt to quit: 2014       Years since quittin.5    Smokeless tobacco: Never Used   Substance and Sexual Activity    Alcohol use: Not on file    Drug use: Not on file    Sexual activity: Not on file   Lifestyle    Physical activity       Days per week: Not on file       Minutes per session: Not on file    Stress: Not on file   Relationships    Social connections       Talks on phone: Not on file       Gets together: Not on file       Attends [employer] service: Not on file       Active member of club or organization: Not on file       Attends meetings of clubs or organizations: Not on file       Relationship status: Not on file    Intimate partner violence       Fear of current or ex partner: Not on file       Emotionally abused: Not on file       Physically abused: Not on file       Forced sexual activity: Not on file   Other Topics Concern    Not on file   Social History Narrative    Not on file        Past Surgical History         Past Surgical History:   Procedure Laterality Date     SECTION         x 3    CHOLECYSTECTOMY         laparoscopic    COLONOSCOPY   10/2006    COLONOSCOPY        Dr Juli Mendoza; tubular adenoma     COLONOSCOPY   2020     Blood adenoma- Dr Va Oneill    ERCP         with biopsy    PANCREAS SURGERY         s/p transduodenal ampullary polyp resection 6/10/19    UPPER GASTROINTESTINAL ENDOSCOPY   2020        Past Medical History        Past Medical History:   Diagnosis Date    Food allergy       MSG, Tomatoes, Orange Juice, Spicy Food    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 ([facility] [address] [facility]      Tubulovillous adenoma 2019     Ampulla of Vater               Current Outpatient Medications on File Prior to Visit   Medication Sig Dispense Refill    pantoprazole (PROTONIX) 40 MG tablet Take 1 tablet by mouth every morning (before breakfast) 90 tablet 3    cetirizine (ZYRTEC) 10 MG tablet Take 10 mg by mouth daily        Cholecalciferol (VITAMIN D3) 1000 units CAPS Take 2,000 Units by mouth daily         Probiotic Product (PROBIOTIC ADVANCED PO) Take by mouth        Multiple Vitamins-Minerals (BEROCCA PO) Take by mouth daily          No current facility-administered medications on file prior to visit. Allergies as of 06/23/2020 - Review Complete 06/23/2020   Allergen Reaction Noted    Zithromax [azithromycin]   07/14/2017           PHYSICAL EXAM:     Blood pressure 130/86, pulse 70, temperature 96.7 °F (35.9 °C), temperature source Temporal, height 5' 4.02\" (1.626 m), weight 205 lb 12.8 oz (93.4 kg), not currently breastfeeding. Gen:  A and O x 3, NAD, well nourished  Eyes:  Sclera non icterus, PERRL  Head:  Normocephalic, non-tender  Neck:  Supple, no adenopathy, thyroid non tender and no masses,no carotid bruits  Lungs:  CTA, symmetrical  Chest:  RRR, no murmurs  Abd:  Soft, NT, ND, no HSM, no hernias, no bruits  Ext:  No edema, no cyanosis  Psych: reveals appr     Breast Exam:     Patient examined in sitting position with hands on hips. Nipple retraction:  no  Skin dimpling:  no  Erythema:  no  Asymmetry:  no  Axillary Lymphadenopathy:  no  Supra Clavicular Lymphadenopathy:  no     Patient placed in a supine position. Right breast mass:  no  Left breast mass:  no  Tenderness:  no     ASSESS MENT:     1. Post stereotactic bx , fibroadenoma right breast        PLAN:     1.   To the OR for excision of fibroadenoma with wire localization

## 2020-07-20 ENCOUNTER — TELEPHONE (OUTPATIENT)
Dept: SURGERY | Age: 65
End: 2020-07-20

## 2020-07-20 LAB — SURGICAL PATHOLOGY REPORT: NORMAL

## 2020-07-23 ENCOUNTER — OFFICE VISIT (OUTPATIENT)
Dept: SURGERY | Age: 65
End: 2020-07-23
Payer: COMMERCIAL

## 2020-07-23 VITALS
WEIGHT: 203 LBS | HEIGHT: 64 IN | TEMPERATURE: 96.8 F | DIASTOLIC BLOOD PRESSURE: 68 MMHG | HEART RATE: 72 BPM | SYSTOLIC BLOOD PRESSURE: 130 MMHG | BODY MASS INDEX: 34.66 KG/M2

## 2020-07-23 PROCEDURE — 99024 POSTOP FOLLOW-UP VISIT: CPT | Performed by: SURGERY

## 2020-07-23 RX ORDER — CEPHALEXIN 500 MG/1
500 CAPSULE ORAL 4 TIMES DAILY
Qty: 28 CAPSULE | Refills: 0 | Status: SHIPPED | OUTPATIENT
Start: 2020-07-23 | End: 2020-07-28

## 2020-07-23 NOTE — PROGRESS NOTES
Patient doing well postop. Her biopsy showed completely benign findings. At this time she has no complaints. On physical exam the breast is healing nicely. There is just a little bit of a ruborous red appearance to it but it is dependent part of the breast and there is some edema. I think this is normal postop finding. I will put her on little Keflex just in case there is a mild cellulitis.

## 2020-12-09 ENCOUNTER — HOSPITAL ENCOUNTER (OUTPATIENT)
Age: 65
Setting detail: SPECIMEN
Discharge: HOME | End: 2020-12-09
Payer: COMMERCIAL

## 2020-12-09 ENCOUNTER — NURSE ONLY (OUTPATIENT)
Dept: FAMILY MEDICINE CLINIC | Age: 65
End: 2020-12-09
Payer: COMMERCIAL

## 2020-12-09 ENCOUNTER — VIRTUAL VISIT (OUTPATIENT)
Dept: FAMILY MEDICINE CLINIC | Age: 65
End: 2020-12-09
Payer: COMMERCIAL

## 2020-12-09 PROCEDURE — U0003 INFECTIOUS AGENT DETECTION BY NUCLEIC ACID (DNA OR RNA); SEVERE ACUTE RESPIRATORY SYNDROME CORONAVIRUS 2 (SARS-COV-2) (CORONAVIRUS DISEASE [COVID-19]), AMPLIFIED PROBE TECHNIQUE, MAKING USE OF HIGH THROUGHPUT TECHNOLOGIES AS DESCRIBED BY CMS-2020-01-R: HCPCS

## 2020-12-09 NOTE — PROGRESS NOTES
[address]  [address]  Dept: [phone]  Dept Fax: [phone]: [phone]     Visit Date:  12/9/2020    Patient:  Juan C Ferreira  YOB: 1955    HPI:   Juan C Ferreira presents today for   Chief Complaint   Patient presents with    Concern For COVID-19     patient has been exposed to covid 19 she would like tested. she has no symptoms at this time    . AUDIO/VIDEO CALL DUE TO COVID 23 HPI 49-year-old female is calling regarding exposure to COVID-19 and she would like to get tested today. She has no symptoms at this point she denies any fever chills cough upper respiratory tract-like symptoms difficulty breathing loss of sensation of taste or smell or any other symptoms from head to toe. 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 She reports she got exposed through her  who was exposed to 3 other people at work. Medications  Prior to Visit Medications    Medication Sig Taking? Authorizing Provider   ASPIRIN 81 PO Take by mouth Yes Historical Provider, MD   pantoprazole (PROTONIX) 40 MG tablet Take 1 tablet by mouth every morning (before breakfast) Yes Kandis Woo MD   Cholecalciferol (VITAMIN D3) 1000 units CAPS Take 2,000 Units by mouth daily  Yes Historical Provider, MD   Probiotic Product (PROBIOTIC ADVANCED PO) Take by mouth Yes Historical Provider, MD   Multiple Vitamins-Minerals (BEROCCA PO) Take by mouth daily Yes Historical Provider, MD   cetirizine (ZYRTEC) 10 MG tablet Take 10 mg by mouth daily  Historical Provider, MD        Allergies:  is allergic to zithromax [azithromycin]. 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 Endocrine: Negative for polyuria. Genitourinary: Negative for difficulty urinating and flank pain. Musculoskeletal: Negative for arthralgias, joint swelling and myalgias. Skin: Negative for rash. Allergic/Immunologic: Negative for environmental allergies. Neurological: Negative for weakness, light-headedness, numbness and headaches. Hematological: Negative for adenopathy. Psychiatric/Behavioral: Negative for behavioral problems and suicidal ideas. The patient is not nervous/anxious. Objective: There were no vitals taken for this visit. Physical Exam        Assessment       Diagnosis Orders   1. Exposure to COVID-19 virus  COVID-19 Ambulatory         PLAN   COVID 19 test ordered. Steps to help prevent the spread of COVID-19 if you are sick  SOURCE - [email or web address].info/. html     Stay home except to get medical care   ; Stay home: People who are mildly ill with COVID-19 are able to isolate at home during their illness. You should restrict activities outside your home, except for getting medical care.   ; Avoid public areas: Do not go to work, school, or public areas.   ; Avoid public transportation: Avoid using public transportation, ride-sharing, or taxis.  ; Separate yourself from other people and animals in your home   ; Stay away from others: As much as possible, you should stay in a specific room and away from other people in your home. Also, you should use a separate bathroom, if available.   ; Limit contact with pets & animals: You should restrict contact with pets and other animals while you are sick with COVID-19, just like you would around other people. Although there have not been reports of pets or other animals becoming sick with COVID-19, it is still recommended that people sick with COVID-19 limit contact with animals until more information is known about the virus. ; When possible, have another member of your household care for your animals while you are sick. If you are sick with COVID-19, avoid contact with your pet, including petting, snuggling, being kissed or licked, and sharing food. If you must care for your pet or be around animals while you are sick, wash your hands before and after you interact with pets and wear a facemask. See COVID-19 and Animals for more information. Other considerations  ? The ill person should eat/be fed in their room if possible. Non-disposable  items used should be handled with gloves and washed with hot water or in a . Clean hands after handling used  items. ? If possible, dedicate a lined trash can for the ill person. Use gloves when removing garbage bags, handling, and disposing of trash. Wash hands after handling or disposing of trash. ? Consider consulting with your local health department about trash disposal guidance if available. Information for Household Members and Caregivers of Someone who is Sick   Call ahead before visiting your doctor   Call ahead: If you have a medical appointment, call the healthcare provider and tell them that you have or may have COVID-19. This will help the healthcare provider's office take steps to keep other people from getting infected or exposed. Wear a facemask if you are sick   ; If you are sick: You should wear a facemask when you are around other people (e.g., sharing a room or vehicle) or pets and before you enter a healthcare provider's office. ; If you are caring for others: If the person who is sick is not able to wear a facemask (for example, because it causes trouble breathing), then people who live with the person who is sick should not stay in the same room with them, or they should wear a facemask if they enter a room with the person who is sick.   Cover your coughs and sneezes ; Cover: Cover your mouth and nose with a tissue when you cough or sneeze.   ; Dispose: Throw used tissues in a lined trash can.   ; Wash hands: Immediately wash your hands with soap and water for at least 20 seconds or, if soap and water are not available, clean your hands with an alcohol-based hand  that contains at least 60% alcohol. Clean your hands often   ; Wash hands: Wash your hands often with soap and water for at least 20 seconds, especially after blowing your nose, coughing, or sneezing; going to the bathroom; and before eating or preparing food.   ; Hand : If soap and water are not readily available, use an alcohol-based hand  with at least 60% alcohol, covering all surfaces of your hands and rubbing them together until they feel dry.   ; Soap and water: Soap and water are the best option if hands are visibly dirty.   ; Avoid touching: Avoid touching your eyes, nose, and mouth with unwashed hands. Handwashing Tips   ; Wet your hands with clean, running water (warm or cold), turn off the tap, and apply soap.  ; Lather your hands by rubbing them together with the soap. Lather the backs of your hands, between your fingers, and under your nails. ; Scrub your hands for at least 20 seconds. Need a timer? Hum the [place] from beginning to end twice.  ; Rinse your hands well under clean, running water.  ; Dry your hands using a clean towel or air dry them. Avoid sharing personal household items   ; Do not share: You should not share dishes, drinking glasses, cups, eating utensils, towels, or bedding with other people or pets in your home.   ; Wash thoroughly after use: After using these items, they should be washed thoroughly with soap and water. Clean all high-touch surfaces everyday   ; Clean and disinfect: Practice routine cleaning of high touch surfaces. ; High touch surfaces include counters, tabletops, doorknobs, bathroom fixtures, toilets, phones, keyboards, tablets, and bedside tables.  ; Disinfect areas with bodily fluids: Also, clean any surfaces that may have blood, stool, or body fluids on them.   ; Household : Use a household cleaning spray or wipe, according to the label instructions. Labels contain instructions for safe and effective use of the cleaning product including precautions you should take when applying the product, such as wearing gloves and making sure you have good ventilation during use of the product. Monitor your symptoms   Seek medical attention: Seek prompt medical attention if your illness is worsening     (e.g., difficulty breathing).   ; Call your doctor: Before seeking care, call your healthcare provider and tell them that you have, or are being evaluated for, COVID-19.   ; Wear a facemask when sick: Put on a facemask before you enter the facility. These steps will help the healthcare provider's office to keep other people in the office or waiting room from getting infected or exposed. ; Alert health department: Ask your healthcare provider to call the local or state health department. Persons who are placed under active monitoring or facilitated self-monitoring should follow instructions provided by their local health department or occupational health professionals, as appropriate.  ; Call 911 if you have a medical emergency: If you have a medical emergency and need to call 911, notify the dispatch personnel that you have, or are being evaluated for COVID-19. If possible, put on a facemask before emergency medical services arrive. Orlando Anne is a 77 y.o. female being evaluated by a Virtual Visit (video visit) encounter to address concerns as mentioned above. A caregiver was present when appropriate. Due to this being a TeleHealth encounter (During [facility]- public health emergency), evaluation of the following organ systems was limited: Vitals/Constitutional/EENT/Resp/CV/GI//MS/Neuro/Skin/Heme-Lymph-Imm. Pursuant to the emergency declaration under the [address] [place] [address] authority and the [employer] and [employer] Act, this Virtual Visit was conducted with patient's (and/or legal guardian's) consent, to reduce the patient's risk of exposure to COVID-19 and provide necessary medical care. The patient (and/or legal guardian) has also been advised to contact this office for worsening conditions or problems, and seek emergency medical treatment and/or call 911 if deemed necessary. Patient identification was verified at the start of the visit: Yes    Total time spent for this encounter: [address] were provided through a video synchronous discussion virtually to substitute for in-person clinic visit. Patient and provider were located at their individual homes. --Laura Kay MD on 12/14/2020 at 10:25 AM    An electronic signature was used to authenticate this note. Orders Placed This Encounter   Procedures    COVID-19 Ambulatory     Standing Status:   Future     Number of Occurrences:   1     Standing Expiration Date:   12/9/2021     Scheduling Instructions:      Saline media preferred given current shortage of viral transport media but both acceptable     Order Specific Question:   Is this test for diagnosis or screening? Answer:   Screening     Order Specific Question:   Symptomatic for COVID-19 as defined by CDC?      Answer:   No     Order Specific Question:   Date of Symptom Onset     Answer:   N/A Order Specific Question:   Hospitalized for COVID-19? Answer:   No     Order Specific Question:   Admitted to ICU for COVID-19? Answer:   No     Order Specific Question:   Employed in healthcare setting? Answer:   No     Order Specific Question:   Resident in a congregate (group) care setting? Answer:   No     Order Specific Question:   Pregnant? Answer:   No     Order Specific Question:   Previously tested for COVID-19? Answer:   Yes        No follow-ups on file. Patient given educational materials - see patient instructions. Discussed use, benefit, and side effects of prescribed medications. All patient questions answered. Pt voiced understanding. Reviewed health maintenance.        Electronically signed Danny Bhatti MD on 12/9/2020 at 2:25 PM EST

## 2020-12-13 LAB — SARS-COV-2, NAA: NOT DETECTED

## 2020-12-14 ASSESSMENT — ENCOUNTER SYMPTOMS
BLOOD IN STOOL: 0
SHORTNESS OF BREATH: 0
TROUBLE SWALLOWING: 0
RHINORRHEA: 0
DIARRHEA: 0
COUGH: 0
ABDOMINAL PAIN: 0
CHEST TIGHTNESS: 0
SORE THROAT: 0
SINUS PAIN: 0

## 2021-03-01 ENCOUNTER — VIRTUAL VISIT (OUTPATIENT)
Dept: FAMILY MEDICINE CLINIC | Age: 66
End: 2021-03-01
Payer: COMMERCIAL

## 2021-03-01 DIAGNOSIS — Z86.010 HISTORY OF COLON POLYPS: ICD-10-CM

## 2021-03-01 DIAGNOSIS — Z12.2 ENCOUNTER FOR SCREENING FOR LUNG CANCER: ICD-10-CM

## 2021-03-01 DIAGNOSIS — R10.11 RUQ PAIN: ICD-10-CM

## 2021-03-01 DIAGNOSIS — E11.9 TYPE 2 DIABETES MELLITUS WITHOUT COMPLICATION, WITHOUT LONG-TERM CURRENT USE OF INSULIN (HCC): Primary | ICD-10-CM

## 2021-03-01 DIAGNOSIS — Z12.31 ENCOUNTER FOR SCREENING MAMMOGRAM FOR BREAST CANCER: ICD-10-CM

## 2021-03-01 PROCEDURE — 2022F DILAT RTA XM EVC RTNOPTHY: CPT | Performed by: NURSE PRACTITIONER

## 2021-03-01 PROCEDURE — 3046F HEMOGLOBIN A1C LEVEL >9.0%: CPT | Performed by: NURSE PRACTITIONER

## 2021-03-01 PROCEDURE — 1090F PRES/ABSN URINE INCON ASSESS: CPT | Performed by: NURSE PRACTITIONER

## 2021-03-01 PROCEDURE — 1123F ACP DISCUSS/DSCN MKR DOCD: CPT | Performed by: NURSE PRACTITIONER

## 2021-03-01 PROCEDURE — G8427 DOCREV CUR MEDS BY ELIG CLIN: HCPCS | Performed by: NURSE PRACTITIONER

## 2021-03-01 PROCEDURE — G8484 FLU IMMUNIZE NO ADMIN: HCPCS | Performed by: NURSE PRACTITIONER

## 2021-03-01 PROCEDURE — G9899 SCRN MAM PERF RSLTS DOC: HCPCS | Performed by: NURSE PRACTITIONER

## 2021-03-01 PROCEDURE — 1036F TOBACCO NON-USER: CPT | Performed by: NURSE PRACTITIONER

## 2021-03-01 PROCEDURE — G8400 PT W/DXA NO RESULTS DOC: HCPCS | Performed by: NURSE PRACTITIONER

## 2021-03-01 PROCEDURE — 4040F PNEUMOC VAC/ADMIN/RCVD: CPT | Performed by: NURSE PRACTITIONER

## 2021-03-01 PROCEDURE — 99214 OFFICE O/P EST MOD 30 MIN: CPT | Performed by: NURSE PRACTITIONER

## 2021-03-01 PROCEDURE — G8417 CALC BMI ABV UP PARAM F/U: HCPCS | Performed by: NURSE PRACTITIONER

## 2021-03-01 PROCEDURE — 3017F COLORECTAL CA SCREEN DOC REV: CPT | Performed by: NURSE PRACTITIONER

## 2021-03-01 RX ORDER — PANTOPRAZOLE SODIUM 40 MG/1
40 TABLET, DELAYED RELEASE ORAL
Qty: 90 TABLET | Refills: 3 | Status: SHIPPED | OUTPATIENT
Start: 2021-03-01

## 2021-03-01 ASSESSMENT — PATIENT HEALTH QUESTIONNAIRE - PHQ9
1. LITTLE INTEREST OR PLEASURE IN DOING THINGS: 0
SUM OF ALL RESPONSES TO PHQ QUESTIONS 1-9: 0

## 2021-03-01 ASSESSMENT — ENCOUNTER SYMPTOMS
COUGH: 0
GASTROINTESTINAL NEGATIVE: 1
SHORTNESS OF BREATH: 0

## 2021-03-01 NOTE — PROGRESS NOTES
Bernice Orozco (:  1955) is a 77 y.o. female,Established patient, here for evaluation of the following chief complaint(s): Established New Doctor      ASSESSMENT/PLAN:  1. Type 2 diabetes mellitus without complication, without long-term current use of insulin (HCC)  -     CBC With Auto Differential; Future  -     Comprehensive Metabolic Panel; Future  -     Lipid Panel; Future  -     Hemoglobin A1C; Future  -     Microalbumin, Ur; Future  2. RUQ pain  -     pantoprazole (PROTONIX) 40 MG tablet; Take 1 tablet by mouth every morning (before breakfast), Disp-90 tablet, R-3Normal  - Discussed trying to wean from Protonix and try Pepcid PRN for known aggravating foods. Patient is open to trying this  3. Encounter for screening for lung cancer  -     CT lung screen [Initial/Annual]; Future  4. Encounter for screening mammogram for breast cancer  -     [employer] [facility] DIGITAL SCREEN SELF REFERRAL W OR WO CAD BILATERAL; Future  5. History of colon polyps  - GI recommended repeat colonoscopy in 5 years    Shingles vaccine and pneumonia vaccine recommended but patient declines at this time. Return in about 6 months (around 2021), or if symptoms worsen or fail to improve. SUBJECTIVE/OBJECTIVE:  HPI  She presents today as a new patient. She previously saw Dr. Pati Jo. Overall patient states she is feeling well. Diabetes is controlled by diet and exercise. She walks 1 mile a day on her treadmill. She takes Protonix daily. She has gone some days without and her heartburn symptoms return if she eats known aggravating foods (chili, fried foods). She is hesitant to stop taking it as she does not want symptoms to return. She had a breast biopsy done 2020 which was normal.  She is not sure if she wants to get another screening mammogram done at this time but she will  the order. Review of Systems   Constitutional: Negative for fatigue. HENT: Negative. Eyes: Negative for visual disturbance. Respiratory: Negative for cough and shortness of breath. Cardiovascular: Negative. Gastrointestinal: Negative. Genitourinary: Negative. Musculoskeletal: Negative. Skin: Negative. Neurological: Negative. Psychiatric/Behavioral: Negative.         Patient-Reported Vitals 3/1/2021   Patient-Reported Weight -   Patient-Reported Height -   Patient-Reported Systolic 218   Patient-Reported Diastolic 84   Patient-Reported Pulse -   Patient-Reported Temperature 97.6        Physical Exam    [INSTRUCTIONS:  \"[x]\" Indicates a positive item  \"[]\" Indicates a negative item  -- DELETE ALL ITEMS NOT EXAMINED]    Constitutional: [x] Appears well-developed and well-nourished [x] No apparent distress      [] Abnormal -     Mental status: [x] Alert and awake  [x] Oriented to person/place/time [x] Able to follow commands    [] Abnormal -     Eyes:   EOM    [x]  Normal    [] Abnormal -   Sclera  [x]  Normal    [] Abnormal -          Discharge [x]  None visible   [] Abnormal -     HENT: [x] Normocephalic, atraumatic  [] Abnormal -   [x] Mouth/Throat: Mucous membranes are moist    External Ears [x] Normal  [] Abnormal -    Neck: [x] No visualized mass [] Abnormal -     Pulmonary/Chest: [x] Respiratory effort normal   [x] No visualized signs of difficulty breathing or respiratory distress        [] Abnormal -      Musculoskeletal:   [x] Normal gait with no signs of ataxia         [x] Normal range of motion of neck        [] Abnormal -     Neurological:        [x] No Facial Asymmetry (Cranial nerve 7 motor function) (limited exam due to video visit)          [x] No gaze palsy        [] Abnormal -          Skin:        [x] No significant exanthematous lesions or discoloration noted on facial skin         [] Abnormal -            Psychiatric:       [x] Normal Affect [] Abnormal -        [x] No Hallucinations    Other pertinent observable physical exam findings:- On this date 3/1/2021 I have spent 30 minutes reviewing previous notes, test results and face to face (virtual) with the patient discussing the diagnosis and importance of compliance with the treatment plan as well as documenting on the day of the visit. Yahir Santo was evaluated through a synchronous (real-time) audio-video encounter. The patient (or guardian if applicable) is aware that this is a billable service. Verbal consent to proceed has been obtained within the past 12 months. The visit was conducted pursuant to the emergency declaration under the [address] [place] [address], [address] [place] [address] authority and the [employer] and [employer] General Act. Patient identification was verified, and a caregiver was present when appropriate. The patient was located in a state where the provider was credentialed to provide care. An electronic signature was used to authenticate this note.     --Cecille Cabrera, MIKE - CNP

## 2021-04-01 LAB
ALBUMIN/GLOBULIN RATIO: 1.6 G/DL
ALBUMIN: 3.9 G/DL (ref 3.5–5)
ALP BLD-CCNC: 67 UNITS/L (ref 38–126)
ALT SERPL-CCNC: 12 UNITS/L (ref 4–35)
ANION GAP SERPL CALCULATED.3IONS-SCNC: 7.1 MMOL/L
AST SERPL-CCNC: 19 UNITS/L (ref 14–36)
BASOPHILS %: 1 (ref 0–3)
BASOPHILS ABSOLUTE: 0.06 (ref 0–0.3)
BILIRUB SERPL-MCNC: 0.5 MG/DL (ref 0.2–1.3)
BUN BLDV-MCNC: 13 MG/DL (ref 7–17)
CALCIUM SERPL-MCNC: 9 MG/DL (ref 8.4–10.2)
CHLORIDE BLD-SCNC: 102 MMOL/L (ref 98–120)
CHOLESTEROL/HDL RATIO: 3.06 RATIO (ref 0–4.5)
CHOLESTEROL: 162 MG/DL (ref 50–200)
CO2: 30 MMOL/L (ref 22–31)
CREAT SERPL-MCNC: 0.7 MG/DL (ref 0.5–1)
CREATININE, RANDOM URINE: 71.3 MG/DL (ref 20–370)
EOSINOPHILS %: 2.09 (ref 0–10)
EOSINOPHILS ABSOLUTE: 0.12 (ref 0–1.1)
GFR CALCULATED: > 60
GLOBULIN: 2.5 G/DL
GLUCOSE: 178 MG/DL (ref 65–105)
HBA1C MFR BLD: 6.8 % (ref 4.4–6.4)
HCT VFR BLD CALC: 45.2 % (ref 37–47)
HDLC SERPL-MCNC: 53 MG/DL (ref 36–68)
HEMOGLOBIN: 14.2 (ref 12–16)
LDL CHOLESTEROL CALCULATED: 78.8 MG/DL (ref 0–160)
LYMPHOCYTE %: 28.8 (ref 20–51.1)
LYMPHOCYTES ABSOLUTE: 1.63 (ref 1–5.5)
MCH RBC QN AUTO: 29.3 PG (ref 28.5–32.5)
MCHC RBC AUTO-ENTMCNC: 31.3 G/DL (ref 32–37)
MCV RBC AUTO: 93.5 FL (ref 80–94)
MICROALBUMIN UR-MCNC: < 0.6 MG/DL (ref 0–1.7)
MONOCYTES %: 6.05 (ref 1.7–9.3)
MONOCYTES ABSOLUTE: 0.34 (ref 0.1–1)
NEUTROPHILS %: 62.05 (ref 42.2–75.2)
NEUTROPHILS ABSOLUTE: 3.5 (ref 2–8.1)
PDW BLD-RTO: 12.6 % (ref 8.5–15.5)
PLATELET # BLD: 229.7 THOU/MM3 (ref 130–400)
POTASSIUM SERPL-SCNC: 4.1 MMOL/L (ref 3.6–5)
RBC: 4.84 M/UL (ref 4.2–5.4)
SODIUM BLD-SCNC: 139 MMOL/L (ref 135–145)
TOTAL PROTEIN, SERUM: 6.4 G/DL (ref 6.3–8.2)
TRIGL SERPL-MCNC: 151 MG/DL (ref 10–250)
VLDLC SERPL CALC-MCNC: 30 MG/DL (ref 0–50)
WBC: 5.6 THOU/ML3 (ref 4.8–10.8)

## 2021-07-21 ENCOUNTER — VIRTUAL VISIT (OUTPATIENT)
Dept: FAMILY MEDICINE CLINIC | Age: 66
End: 2021-07-21
Payer: COMMERCIAL

## 2021-07-21 DIAGNOSIS — R50.9 FEVER, UNSPECIFIED FEVER CAUSE: ICD-10-CM

## 2021-07-21 DIAGNOSIS — U07.1 LAB TEST POSITIVE FOR DETECTION OF COVID-19 VIRUS: ICD-10-CM

## 2021-07-21 DIAGNOSIS — R68.83 CHILLS: ICD-10-CM

## 2021-07-21 DIAGNOSIS — R51.9 ACUTE INTRACTABLE HEADACHE, UNSPECIFIED HEADACHE TYPE: ICD-10-CM

## 2021-07-21 DIAGNOSIS — R53.83 OTHER FATIGUE: ICD-10-CM

## 2021-07-21 DIAGNOSIS — M79.10 MYALGIA: Primary | ICD-10-CM

## 2021-07-21 PROCEDURE — 99212 OFFICE O/P EST SF 10 MIN: CPT

## 2021-07-21 SDOH — ECONOMIC STABILITY: FOOD INSECURITY: WITHIN THE PAST 12 MONTHS, THE FOOD YOU BOUGHT JUST DIDN'T LAST AND YOU DIDN'T HAVE MONEY TO GET MORE.: NEVER TRUE

## 2021-07-21 SDOH — ECONOMIC STABILITY: FOOD INSECURITY: WITHIN THE PAST 12 MONTHS, YOU WORRIED THAT YOUR FOOD WOULD RUN OUT BEFORE YOU GOT MONEY TO BUY MORE.: NEVER TRUE

## 2021-07-21 ASSESSMENT — PATIENT HEALTH QUESTIONNAIRE - PHQ9
1. LITTLE INTEREST OR PLEASURE IN DOING THINGS: 0
SUM OF ALL RESPONSES TO PHQ QUESTIONS 1-9: 0
2. FEELING DOWN, DEPRESSED OR HOPELESS: 0
SUM OF ALL RESPONSES TO PHQ9 QUESTIONS 1 & 2: 0

## 2021-07-21 ASSESSMENT — SOCIAL DETERMINANTS OF HEALTH (SDOH): HOW HARD IS IT FOR YOU TO PAY FOR THE VERY BASICS LIKE FOOD, HOUSING, MEDICAL CARE, AND HEATING?: NOT VERY HARD

## 2021-07-21 NOTE — PROGRESS NOTES
Abner Thomas (:  1955) is a 77 y.o. female,Established patient, here for evaluation of the following chief complaint(s): Concern For COVID-19 (She was exposed to Covid at  2021 and 2021. Cough started 2021. Sniffles, diarrhea, chills, low grade fever, headache followed. 21 felt better till evening when chills, low grade fever returned. She took [email or web address] Covid test last night and it was positive. She continues to be fatigued. She was vaccinated with [facility] in January and February.  )         ASSESSMENT/PLAN:  1. Myalgia  2. Lab test positive for detection of COVID-19 virus  3. Fever, unspecified fever cause  4. Chills  5. Acute intractable headache, unspecified headache type  6. Other fatigue      No follow-ups on file. CC:  HPI 13-year-old female is calling with recent lab tested positive for COVID-19 and associated symptoms of myalgia fever chills headache and fatigue. She reports she was at a  and that is where she contacted the virus and despite being vaccinated she feels pretty rough. She started having cough and has been using cough drops persistent headache diarrhea chills low energy sniffles. She tried Sudafed she feels like it is a bad cold like symptoms she checked her temperature was 100 °F with body aches. Review of Systems   Constitutional: Positive for chills, fatigue and fever. Negative for unexpected weight change. HENT: Positive for congestion and rhinorrhea. Negative for ear pain, postnasal drip, sinus pain, sore throat and trouble swallowing. Eyes: Negative for visual disturbance. Respiratory: Positive for cough. Negative for chest tightness and shortness of breath. Cardiovascular: Negative for chest pain and leg swelling. Gastrointestinal: Negative for abdominal pain, blood in stool and diarrhea. Endocrine: Negative for polyuria. Genitourinary: Negative for difficulty urinating and flank pain. Musculoskeletal: Negative for arthralgias, joint swelling and myalgias. Skin: Negative for rash. Allergic/Immunologic: Negative for environmental allergies. Neurological: Positive for headaches. Negative for weakness, light-headedness and numbness. Hematological: Negative for adenopathy. Psychiatric/Behavioral: Negative for behavioral problems and suicidal ideas. The patient is not nervous/anxious. Patient-Reported Vitals 7/21/2021   Patient-Reported Weight 200lb   Patient-Reported Height -   Patient-Reported Systolic -   Patient-Reported Diastolic -   Patient-Reported Pulse -   Patient-Reported Temperature 99        Physical Exam      On this date 7/21/2021 I have spent 30 minutes reviewing previous notes, test results and face to face (virtual) with the patient discussing the diagnosis and importance of compliance with the treatment plan as well as documenting on the day of the visit. A:P  Continue monitoring pressure blood pressure oxygen levels drink plenty of fluids hydration rest Tylenol for pain and fever vitamin C vitamin D zinc magnesium unit boosters. Steps to help prevent the spread of COVID-19 if you are sick  SOURCE - https://liam-roberson.info/. html     Stay home except to get medical care   ; Stay home: People who are mildly ill with COVID-19 are able to isolate at home during their illness. You should restrict activities outside your home, except for getting medical care.   ; Avoid public areas: Do not go to work, school, or public areas.   ; Avoid public transportation: Avoid using public transportation, ride-sharing, or taxis.  ; Separate yourself from other people and animals in your home   ; Stay away from others: As much as possible, you should stay in a specific room and away from other people in your home. Also, you should use a separate bathroom, if available.   ; Limit contact with pets & animals:  You should restrict contact with pets and other animals while you are sick with COVID-19, just like you would around other people. Although there have not been reports of pets or other animals becoming sick with COVID-19, it is still recommended that people sick with COVID-19 limit contact with animals until more information is known about the virus. ; When possible, have another member of your household care for your animals while you are sick. If you are sick with COVID-19, avoid contact with your pet, including petting, snuggling, being kissed or licked, and sharing food. If you must care for your pet or be around animals while you are sick, wash your hands before and after you interact with pets and wear a facemask. See COVID-19 and Animals for more information. Other considerations   The ill person should eat/be fed in their room if possible. Non-disposable  items used should be handled with gloves and washed with hot water or in a . Clean hands after handling used  items.  If possible, dedicate a lined trash can for the ill person. Use gloves when removing garbage bags, handling, and disposing of trash. Wash hands after handling or disposing of trash.  Consider consulting with your local health department about trash disposal guidance if available. Information for Household Members and Caregivers of Someone who is Sick   Call ahead before visiting your doctor   Call ahead: If you have a medical appointment, call the healthcare provider and tell them that you have or may have COVID-19. This will help the healthcare provider's office take steps to keep other people from getting infected or exposed. Wear a facemask if you are sick   ; If you are sick: You should wear a facemask when you are around other people (e.g., sharing a room or vehicle) or pets and before you enter a healthcare provider's office.    ; If you are caring for others: If the person who is sick is not able to wear a facemask (for example, because it causes trouble breathing), then people who live with the person who is sick should not stay in the same room with them, or they should wear a facemask if they enter a room with the person who is sick. Cover your coughs and sneezes   ; Cover: Cover your mouth and nose with a tissue when you cough or sneeze.   ; Dispose: Throw used tissues in a lined trash can.   ; Wash hands: Immediately wash your hands with soap and water for at least 20 seconds or, if soap and water are not available, clean your hands with an alcohol-based hand  that contains at least 60% alcohol. Clean your hands often   ; Wash hands: Wash your hands often with soap and water for at least 20 seconds, especially after blowing your nose, coughing, or sneezing; going to the bathroom; and before eating or preparing food.   ; Hand : If soap and water are not readily available, use an alcohol-based hand  with at least 60% alcohol, covering all surfaces of your hands and rubbing them together until they feel dry.   ; Soap and water: Soap and water are the best option if hands are visibly dirty.   ; Avoid touching: Avoid touching your eyes, nose, and mouth with unwashed hands. Handwashing Tips   ; Wet your hands with clean, running water (warm or cold), turn off the tap, and apply soap.  ; Lather your hands by rubbing them together with the soap. Lather the backs of your hands, between your fingers, and under your nails. ; Scrub your hands for at least 20 seconds. Need a timer? Hum the [place] from beginning to end twice.  ; Rinse your hands well under clean, running water.  ; Dry your hands using a clean towel or air dry them. Avoid sharing personal household items   ; Do not share: You should not share dishes, drinking glasses, cups, eating utensils, towels, or bedding with other people or pets in your home.   ; Wash thoroughly after use:  After using these items, they should be washed thoroughly with soap and water. Clean all high-touch surfaces everyday   ; Clean and disinfect: Practice routine cleaning of high touch surfaces.  ; High touch surfaces include counters, tabletops, doorknobs, bathroom fixtures, toilets, phones, keyboards, tablets, and bedside tables.  ; Disinfect areas with bodily fluids: Also, clean any surfaces that may have blood, stool, or body fluids on them.   ; Household : Use a household cleaning spray or wipe, according to the label instructions. Labels contain instructions for safe and effective use of the cleaning product including precautions you should take when applying the product, such as wearing gloves and making sure you have good ventilation during use of the product. Monitor your symptoms   Seek medical attention: Seek prompt medical attention if your illness is worsening     (e.g., difficulty breathing).   ; Call your doctor: Before seeking care, call your healthcare provider and tell them that you have, or are being evaluated for, COVID-19.   ; Wear a facemask when sick: Put on a facemask before you enter the facility. These steps will help the healthcare provider's office to keep other people in the office or waiting room from getting infected or exposed. ; Alert health department: Ask your healthcare provider to call the local or state health department. Persons who are placed under active monitoring or facilitated self-monitoring should follow instructions provided by their local health department or occupational health professionals, as appropriate.  ; Call 911 if you have a medical emergency: If you have a medical emergency and need to call 911, notify the dispatch personnel that you have, or are being evaluated for COVID-19. If possible, put on a facemask before emergency medical services arrive. Stephanie Sosa, was evaluated through a synchronous (real-time) audio-video encounter.  The patient (or guardian if applicable) is aware that this is a billable service. Verbal consent to proceed has been obtained within the past 12 months. The visit was conducted pursuant to the emergency declaration under the [address] [place] [address] authority and the [employer] General Act. Patient identification was verified, and a caregiver was present when appropriate. The patient was located in a state where the provider was credentialed to provide care. An electronic signature was used to authenticate this note.     --Adelia Shore MD

## 2021-07-30 ASSESSMENT — ENCOUNTER SYMPTOMS
COUGH: 1
DIARRHEA: 0
RHINORRHEA: 1
ABDOMINAL PAIN: 0
TROUBLE SWALLOWING: 0
BLOOD IN STOOL: 0
SHORTNESS OF BREATH: 0
SINUS PAIN: 0
SORE THROAT: 0
CHEST TIGHTNESS: 0

## 2023-02-10 LAB
ALBUMIN/GLOBULIN RATIO: 1.6 G/DL
ALBUMIN: 4 G/DL (ref 3.5–5)
ALP BLD-CCNC: 71 UNITS/L (ref 38–126)
ALT SERPL-CCNC: 14 UNITS/L (ref 4–35)
ANION GAP SERPL CALCULATED.3IONS-SCNC: 3.3 MMOL/L
AST SERPL-CCNC: 17 UNITS/L (ref 14–36)
BASOPHILS %: 0.69 (ref 0–3)
BASOPHILS ABSOLUTE: 0.07 (ref 0–0.3)
BILIRUB SERPL-MCNC: 0.7 MG/DL (ref 0.2–1.3)
BUN BLDV-MCNC: 12 MG/DL (ref 7–17)
CALCIUM SERPL-MCNC: 9.2 MG/DL (ref 8.4–10.2)
CHLORIDE BLD-SCNC: 107 MMOL/L (ref 98–120)
CO2: 27 MMOL/L (ref 22–31)
CREAT SERPL-MCNC: 0.7 MG/DL (ref 0.5–1)
EOSINOPHILS %: 0.93 (ref 0–10)
EOSINOPHILS ABSOLUTE: 0.09 (ref 0–1.1)
GFR CALCULATED: > 60
GLOBULIN: 2.6 G/DL
GLUCOSE: 164 MG/DL (ref 65–105)
HCT VFR BLD CALC: 42.8 % (ref 37–47)
HEMOGLOBIN: 13.7 (ref 12–16)
LYMPHOCYTE %: 13.06 (ref 20–51.1)
LYMPHOCYTES ABSOLUTE: 1.24 (ref 1–5.5)
MCH RBC QN AUTO: 29.9 PG (ref 28.5–32.5)
MCHC RBC AUTO-ENTMCNC: 32 G/DL (ref 32–37)
MCV RBC AUTO: 93.5 FL (ref 80–94)
MONOCYTES %: 8.09 (ref 1.7–9.3)
MONOCYTES ABSOLUTE: 0.77 (ref 0.1–1)
NEUTROPHILS %: 77.23 (ref 42.2–75.2)
NEUTROPHILS ABSOLUTE: 7.31 (ref 2–8.1)
PDW BLD-RTO: 12.1 % (ref 8.5–15.5)
PLATELET # BLD: 261.5 THOU/MM3 (ref 130–400)
POTASSIUM SERPL-SCNC: 4 MMOL/L (ref 3.6–5)
RBC: 4.58 M/UL (ref 4.2–5.4)
SODIUM BLD-SCNC: 137 MMOL/L (ref 135–145)
T3 FREE: 2.22 PG/ML (ref 2.02–4.43)
TOTAL PROTEIN, SERUM: 6.6 G/DL (ref 6.3–8.2)
WBC: 9.5 THOU/ML3 (ref 4.8–10.8)

## (undated) DEVICE — AGENT HEMSTAT 3GM OXIDIZED REGENERATED CELOS ABSRB FOR CONT (ORDER MULTIPLES OF 5EA)

## (undated) DEVICE — SUTURE VCRL SZ 3-0 L18IN ABSRB UD POLYGLACTIN 910 BRAID TIE J910T

## (undated) DEVICE — 9165 UNIVERSAL PATIENT PLATE: Brand: 3M™

## (undated) DEVICE — TOWEL,OR,DSP,ST,BLUE,STD,4/PK,20PK/CS: Brand: MEDLINE

## (undated) DEVICE — GAUZE,SPONGE,4"X4",12PLY,STERILE,LF,2'S: Brand: MEDLINE

## (undated) DEVICE — SYRINGE,EAR/ULCER, 2 OZ, STERILE: Brand: MEDLINE

## (undated) DEVICE — CHLORAPREP 26ML ORANGE

## (undated) DEVICE — SUTURE VCRL SZ 4-0 L18IN ABSRB UD L19MM PS-2 3/8 CIR PRIM J496H

## (undated) DEVICE — Z DISCONTINUED USE 2624853 GLOVE SURG SZ 75 L12IN THK91MIL BRN LTX FREE

## (undated) DEVICE — GOWN,AURORA,NONRNF,XL,30/CS: Brand: MEDLINE

## (undated) DEVICE — MASTISOL ADHESIVE LIQ 2/3ML

## (undated) DEVICE — GLOVE ORANGE PI 7 1/2   MSG9075

## (undated) DEVICE — GLOVE ORANGE PI 7   MSG9070

## (undated) DEVICE — PACK SURG PROC REMINDER N WVN DISPOSABLE BEAC TIME OUT

## (undated) DEVICE — TUBING, SUCTION, 3/16" X 20', STRAIGHT: Brand: MEDLINE

## (undated) DEVICE — SHEET,DRAPE,40X58,STERILE: Brand: MEDLINE

## (undated) DEVICE — STRIP,CLOSURE,WOUND,MEDI-STRIP,1/2X4: Brand: MEDLINE

## (undated) DEVICE — 3M™ TEGADERM™ TRANSPARENT FILM DRESSING FRAME STYLE, 1626W, 4 IN X 4-3/4 IN (10 CM X 12 CM), 50/CT 4CT/CASE: Brand: 3M™ TEGADERM™

## (undated) DEVICE — BASIN SET: Brand: MEDLINE INDUSTRIES, INC.

## (undated) DEVICE — MARKER W/PRE PRINTED CUSTOM LABEL

## (undated) DEVICE — PACK,LAPAROTOMY,PK IV,AURORA: Brand: MEDLINE

## (undated) DEVICE — 3M™ WARMING BLANKET, LOWER BODY, 10 PER CASE, 42568: Brand: BAIR HUGGER™

## (undated) DEVICE — SUTURE NONABSORBABLE MONOFILAMENT 3-0 PS-1 18 IN BLK ETHILON 1663H

## (undated) DEVICE — SOLUTION IV IRRIG WATER 1000ML POUR BRL 2F7114

## (undated) DEVICE — GARMENT,MEDLINE,DVT,INT,CALF,MED, GEN2: Brand: MEDLINE

## (undated) DEVICE — SUTURE VCRL SZ 3-0 L27IN ABSRB UD L26MM CT-2 1/2 CIR J232H

## (undated) DEVICE — 4-PORT MANIFOLD: Brand: NEPTUNE 2

## (undated) DEVICE — GLOVE SURG SZ 65 THK91MIL LTX FREE SYN POLYISOPRENE